# Patient Record
Sex: FEMALE | Race: BLACK OR AFRICAN AMERICAN | NOT HISPANIC OR LATINO | URBAN - METROPOLITAN AREA
[De-identification: names, ages, dates, MRNs, and addresses within clinical notes are randomized per-mention and may not be internally consistent; named-entity substitution may affect disease eponyms.]

---

## 2019-06-17 VITALS
SYSTOLIC BLOOD PRESSURE: 170 MMHG | HEART RATE: 69 BPM | TEMPERATURE: 98 F | DIASTOLIC BLOOD PRESSURE: 106 MMHG | OXYGEN SATURATION: 98 % | RESPIRATION RATE: 18 BRPM

## 2019-06-17 LAB
CK SERPL-CCNC: 492 U/L — HIGH (ref 26–192)
D DIMER BLD IA.RAPID-MCNC: <187 NG/ML DDU — SIGNIFICANT CHANGE UP
HCG UR QL: NEGATIVE — SIGNIFICANT CHANGE UP
MAGNESIUM SERPL-MCNC: 1.7 MG/DL — SIGNIFICANT CHANGE UP (ref 1.6–2.6)
TSH SERPL-MCNC: 0.27 UIU/ML — LOW (ref 0.36–3.74)

## 2019-06-17 PROCEDURE — 71275 CT ANGIOGRAPHY CHEST: CPT | Mod: 26

## 2019-06-17 RX ORDER — SODIUM CHLORIDE 9 MG/ML
1000 INJECTION INTRAMUSCULAR; INTRAVENOUS; SUBCUTANEOUS ONCE
Refills: 0 | Status: COMPLETED | OUTPATIENT
Start: 2019-06-17 | End: 2019-06-17

## 2019-06-17 RX ORDER — MAGNESIUM SULFATE 500 MG/ML
2 VIAL (ML) INJECTION ONCE
Refills: 0 | Status: COMPLETED | OUTPATIENT
Start: 2019-06-17 | End: 2019-06-17

## 2019-06-17 RX ORDER — ASPIRIN/CALCIUM CARB/MAGNESIUM 324 MG
162 TABLET ORAL ONCE
Refills: 0 | Status: COMPLETED | OUTPATIENT
Start: 2019-06-17 | End: 2019-06-17

## 2019-06-17 RX ADMIN — SODIUM CHLORIDE 1000 MILLILITER(S): 9 INJECTION INTRAMUSCULAR; INTRAVENOUS; SUBCUTANEOUS at 21:07

## 2019-06-17 RX ADMIN — Medication 50 GRAM(S): at 21:12

## 2019-06-17 RX ADMIN — Medication 162 MILLIGRAM(S): at 21:07

## 2019-06-17 NOTE — ED PROVIDER NOTE - CLINICAL SUMMARY MEDICAL DECISION MAKING FREE TEXT BOX
rule out nstemi, serial troponins, cta rule out pe, ck tsh. patient otherwise hd stble with twi in anterior leads of ekg and unifcal pvcs.

## 2019-06-17 NOTE — ED PROVIDER NOTE - OBJECTIVE STATEMENT
Patient presents after an episode of chest pressure which started at 5:30 today, which lasted 45 min, associated with tightness to left arm, and then radiated into R arm. notes shortness of breath, and generalized weakness. patient walked to a [facility] md, they did an ekg, and bp. noting her BP was high and patient was referred to ED. patient notes she recently stopped birth control 5 days. Patient notes post surgery for myomectomy at [facility] on april 22nd, with no complications. patient notes she started working out today, and did a soul cycle class, and it was a hard workout. denies other medical problems. denies smoking, etoh or drugs. denies abd pain, N/V/D. notes doing mild yoga recently and notices soreness around the wound. otherwise not abdominal pain or fevers.

## 2019-06-17 NOTE — ED ADULT NURSE REASSESSMENT NOTE - NS ED NURSE REASSESS COMMENT FT1
Pt received from ER [facility] One[facility]. Pt denies pain or sob at this time. Pt pending admission to [facility]. Pt stable will continue to monitor.

## 2019-06-17 NOTE — ED ADULT NURSE NOTE - NSIMPLEMENTINTERV_GEN_ALL_ED
Implemented All Universal Safety Interventions:  [place] to call system. Call bell, personal items and telephone within reach. Instruct patient to call for assistance. Room bathroom lighting operational. Non-slip footwear when patient is off stretcher. Physically safe environment: no spills, clutter or unnecessary equipment. Stretcher in lowest position, wheels locked, appropriate side rails in place.

## 2019-06-17 NOTE — ED ADULT TRIAGE NOTE - CHIEF COMPLAINT QUOTE
pt had fibroids removed April 22 and also recently stopped birth control. Today was walking and began to feel bilateral arm numbness, shortness of breath, chest pressure  no other medical hx or family hx  First time working out today

## 2019-06-18 ENCOUNTER — INPATIENT (INPATIENT)
Facility: HOSPITAL | Age: 39
LOS: 2 days | Discharge: HOME | DRG: 246 | End: 2019-06-21
Attending: HOSPITALIST | Admitting: HOSPITALIST
Payer: COMMERCIAL

## 2019-06-18 DIAGNOSIS — I21.4 NON-ST ELEVATION (NSTEMI) MYOCARDIAL INFARCTION: ICD-10-CM

## 2019-06-18 DIAGNOSIS — D64.9 ANEMIA, UNSPECIFIED: ICD-10-CM

## 2019-06-18 LAB
ALBUMIN SERPL ELPH-MCNC: 3.9 G/DL — SIGNIFICANT CHANGE UP (ref 3.3–5)
ALP SERPL-CCNC: 33 U/L — LOW (ref 40–120)
ALT FLD-CCNC: 10 U/L — SIGNIFICANT CHANGE UP (ref 10–45)
ANION GAP SERPL CALC-SCNC: 10 MMOL/L — SIGNIFICANT CHANGE UP (ref 5–17)
ANION GAP SERPL CALC-SCNC: 9 MMOL/L — SIGNIFICANT CHANGE UP (ref 5–17)
APTT BLD: 47.3 SEC — HIGH (ref 27.5–36.3)
AST SERPL-CCNC: 31 U/L — SIGNIFICANT CHANGE UP (ref 10–40)
BILIRUB SERPL-MCNC: 0.2 MG/DL — SIGNIFICANT CHANGE UP (ref 0.2–1.2)
BUN SERPL-MCNC: 5 MG/DL — LOW (ref 7–23)
BUN SERPL-MCNC: 8 MG/DL — SIGNIFICANT CHANGE UP (ref 7–23)
CALCIUM SERPL-MCNC: 8.2 MG/DL — LOW (ref 8.4–10.5)
CALCIUM SERPL-MCNC: 8.7 MG/DL — SIGNIFICANT CHANGE UP (ref 8.4–10.5)
CHLORIDE SERPL-SCNC: 102 MMOL/L — SIGNIFICANT CHANGE UP (ref 96–108)
CHLORIDE SERPL-SCNC: 106 MMOL/L — SIGNIFICANT CHANGE UP (ref 96–108)
CO2 SERPL-SCNC: 22 MMOL/L — SIGNIFICANT CHANGE UP (ref 22–31)
CO2 SERPL-SCNC: 24 MMOL/L — SIGNIFICANT CHANGE UP (ref 22–31)
CREAT SERPL-MCNC: 0.58 MG/DL — SIGNIFICANT CHANGE UP (ref 0.5–1.3)
CREAT SERPL-MCNC: 0.62 MG/DL — SIGNIFICANT CHANGE UP (ref 0.5–1.3)
GLUCOSE SERPL-MCNC: 110 MG/DL — HIGH (ref 70–99)
GLUCOSE SERPL-MCNC: 85 MG/DL — SIGNIFICANT CHANGE UP (ref 70–99)
HBA1C BLD-MCNC: 5 % — SIGNIFICANT CHANGE UP (ref 4–5.6)
HCT VFR BLD CALC: 33.8 % — LOW (ref 34.5–45)
HCT VFR BLD CALC: 40.2 % — SIGNIFICANT CHANGE UP (ref 34.5–45)
HGB BLD-MCNC: 10.2 G/DL — LOW (ref 11.5–15.5)
HGB BLD-MCNC: 11.7 G/DL — SIGNIFICANT CHANGE UP (ref 11.5–15.5)
MAGNESIUM SERPL-MCNC: 1.8 MG/DL — SIGNIFICANT CHANGE UP (ref 1.6–2.6)
MAGNESIUM SERPL-MCNC: 2 MG/DL — SIGNIFICANT CHANGE UP (ref 1.6–2.6)
MCHC RBC-ENTMCNC: 24.5 PG — LOW (ref 27–34)
MCHC RBC-ENTMCNC: 25.1 PG — LOW (ref 27–34)
MCHC RBC-ENTMCNC: 29.1 GM/DL — LOW (ref 32–36)
MCHC RBC-ENTMCNC: 30.2 GM/DL — LOW (ref 32–36)
MCV RBC AUTO: 83.3 FL — SIGNIFICANT CHANGE UP (ref 80–100)
MCV RBC AUTO: 84.3 FL — SIGNIFICANT CHANGE UP (ref 80–100)
NRBC # BLD: 0 /100 WBCS — SIGNIFICANT CHANGE UP (ref 0–0)
NRBC # BLD: 0 /100 WBCS — SIGNIFICANT CHANGE UP (ref 0–0)
PLATELET # BLD AUTO: 164 K/UL — SIGNIFICANT CHANGE UP (ref 150–400)
PLATELET # BLD AUTO: 195 K/UL — SIGNIFICANT CHANGE UP (ref 150–400)
POTASSIUM SERPL-MCNC: 3.4 MMOL/L — LOW (ref 3.5–5.3)
POTASSIUM SERPL-MCNC: 3.6 MMOL/L — SIGNIFICANT CHANGE UP (ref 3.5–5.3)
POTASSIUM SERPL-SCNC: 3.4 MMOL/L — LOW (ref 3.5–5.3)
POTASSIUM SERPL-SCNC: 3.6 MMOL/L — SIGNIFICANT CHANGE UP (ref 3.5–5.3)
PROT SERPL-MCNC: 6.2 G/DL — SIGNIFICANT CHANGE UP (ref 6–8.3)
RBC # BLD: 4.06 M/UL — SIGNIFICANT CHANGE UP (ref 3.8–5.2)
RBC # BLD: 4.77 M/UL — SIGNIFICANT CHANGE UP (ref 3.8–5.2)
RBC # FLD: 21.1 % — HIGH (ref 10.3–14.5)
RBC # FLD: 21.2 % — HIGH (ref 10.3–14.5)
SODIUM SERPL-SCNC: 134 MMOL/L — LOW (ref 135–145)
SODIUM SERPL-SCNC: 139 MMOL/L — SIGNIFICANT CHANGE UP (ref 135–145)
T4 AB SER-ACNC: 5.42 UG/DL — SIGNIFICANT CHANGE UP (ref 3.17–11.72)
TROPONIN I SERPL-MCNC: 0.98 NG/ML — CRITICAL HIGH (ref 0.02–0.06)
TROPONIN T SERPL-MCNC: 0.18 NG/ML — CRITICAL HIGH (ref 0–0.01)
TROPONIN T SERPL-MCNC: 0.19 NG/ML — CRITICAL HIGH (ref 0–0.01)
TSH SERPL-MCNC: 0.33 UIU/ML — LOW (ref 0.35–4.94)
WBC # BLD: 3.8 K/UL — SIGNIFICANT CHANGE UP (ref 3.8–10.5)
WBC # BLD: 4.71 K/UL — SIGNIFICANT CHANGE UP (ref 3.8–10.5)
WBC # FLD AUTO: 3.8 K/UL — SIGNIFICANT CHANGE UP (ref 3.8–10.5)
WBC # FLD AUTO: 4.71 K/UL — SIGNIFICANT CHANGE UP (ref 3.8–10.5)

## 2019-06-18 PROCEDURE — 92928 PRQ TCAT PLMT NTRAC ST 1 LES: CPT | Mod: LD

## 2019-06-18 PROCEDURE — 92978 ENDOLUMINL IVUS OCT C 1ST: CPT | Mod: 26

## 2019-06-18 PROCEDURE — 71045 X-RAY EXAM CHEST 1 VIEW: CPT | Mod: 26

## 2019-06-18 PROCEDURE — 93010 ELECTROCARDIOGRAM REPORT: CPT | Mod: 76

## 2019-06-18 PROCEDURE — 93306 TTE W/DOPPLER COMPLETE: CPT | Mod: 26

## 2019-06-18 PROCEDURE — 99285 EMERGENCY DEPT VISIT HI MDM: CPT | Mod: 25

## 2019-06-18 PROCEDURE — 99223 1ST HOSP IP/OBS HIGH 75: CPT

## 2019-06-18 PROCEDURE — 93458 L HRT ARTERY/VENTRICLE ANGIO: CPT | Mod: 26,XU

## 2019-06-18 PROCEDURE — 92929: CPT | Mod: LD,59

## 2019-06-18 RX ORDER — TICAGRELOR 90 MG/1
90 TABLET ORAL EVERY 12 HOURS
Refills: 0 | Status: DISCONTINUED | OUTPATIENT
Start: 2019-06-19 | End: 2019-06-21

## 2019-06-18 RX ORDER — SODIUM CHLORIDE 9 MG/ML
1000 INJECTION INTRAMUSCULAR; INTRAVENOUS; SUBCUTANEOUS
Refills: 0 | Status: DISCONTINUED | OUTPATIENT
Start: 2019-06-18 | End: 2019-06-18

## 2019-06-18 RX ORDER — ATORVASTATIN CALCIUM 80 MG/1
80 TABLET, FILM COATED ORAL AT BEDTIME
Refills: 0 | Status: DISCONTINUED | OUTPATIENT
Start: 2019-06-18 | End: 2019-06-21

## 2019-06-18 RX ORDER — HEPARIN SODIUM 5000 [USP'U]/ML
INJECTION INTRAVENOUS; SUBCUTANEOUS
Qty: 25000 | Refills: 0 | Status: DISCONTINUED | OUTPATIENT
Start: 2019-06-18 | End: 2019-06-18

## 2019-06-18 RX ORDER — HEPARIN SODIUM 5000 [USP'U]/ML
5000 INJECTION INTRAVENOUS; SUBCUTANEOUS EVERY 8 HOURS
Refills: 0 | Status: DISCONTINUED | OUTPATIENT
Start: 2019-06-18 | End: 2019-06-20

## 2019-06-18 RX ORDER — POTASSIUM CHLORIDE 20 MEQ
40 PACKET (EA) ORAL EVERY 4 HOURS
Refills: 0 | Status: COMPLETED | OUTPATIENT
Start: 2019-06-18 | End: 2019-06-18

## 2019-06-18 RX ORDER — CARVEDILOL PHOSPHATE 80 MG/1
12.5 CAPSULE, EXTENDED RELEASE ORAL EVERY 12 HOURS
Refills: 0 | Status: DISCONTINUED | OUTPATIENT
Start: 2019-06-18 | End: 2019-06-21

## 2019-06-18 RX ORDER — NITROGLYCERIN 6.5 MG
0.4 CAPSULE, EXTENDED RELEASE ORAL ONCE
Refills: 0 | Status: DISCONTINUED | OUTPATIENT
Start: 2019-06-18 | End: 2019-06-18

## 2019-06-18 RX ORDER — ASPIRIN/CALCIUM CARB/MAGNESIUM 324 MG
81 TABLET ORAL DAILY
Refills: 0 | Status: DISCONTINUED | OUTPATIENT
Start: 2019-06-19 | End: 2019-06-21

## 2019-06-18 RX ORDER — ATORVASTATIN CALCIUM 80 MG/1
40 TABLET, FILM COATED ORAL AT BEDTIME
Refills: 0 | Status: DISCONTINUED | OUTPATIENT
Start: 2019-06-18 | End: 2019-06-18

## 2019-06-18 RX ORDER — ACETAMINOPHEN 500 MG
650 TABLET ORAL ONCE
Refills: 0 | Status: COMPLETED | OUTPATIENT
Start: 2019-06-18 | End: 2019-06-18

## 2019-06-18 RX ORDER — ASPIRIN/CALCIUM CARB/MAGNESIUM 324 MG
162 TABLET ORAL ONCE
Refills: 0 | Status: COMPLETED | OUTPATIENT
Start: 2019-06-18 | End: 2019-06-18

## 2019-06-18 RX ORDER — POTASSIUM CHLORIDE 20 MEQ
10 PACKET (EA) ORAL
Refills: 0 | Status: DISCONTINUED | OUTPATIENT
Start: 2019-06-18 | End: 2019-06-19

## 2019-06-18 RX ORDER — CLOPIDOGREL BISULFATE 75 MG/1
600 TABLET, FILM COATED ORAL ONCE
Refills: 0 | Status: COMPLETED | OUTPATIENT
Start: 2019-06-18 | End: 2019-06-18

## 2019-06-18 RX ORDER — ONDANSETRON 8 MG/1
4 TABLET, FILM COATED ORAL ONCE
Refills: 0 | Status: COMPLETED | OUTPATIENT
Start: 2019-06-18 | End: 2019-06-18

## 2019-06-18 RX ORDER — HYDRALAZINE HCL 50 MG
10 TABLET ORAL ONCE
Refills: 0 | Status: COMPLETED | OUTPATIENT
Start: 2019-06-18 | End: 2019-06-18

## 2019-06-18 RX ORDER — MAGNESIUM SULFATE 500 MG/ML
1 VIAL (ML) INJECTION ONCE
Refills: 0 | Status: COMPLETED | OUTPATIENT
Start: 2019-06-18 | End: 2019-06-19

## 2019-06-18 RX ADMIN — Medication 650 MILLIGRAM(S): at 23:28

## 2019-06-18 RX ADMIN — Medication 40 MILLIEQUIVALENT(S): at 13:12

## 2019-06-18 RX ADMIN — HEPARIN SODIUM 1150 UNIT(S)/HR: 5000 INJECTION INTRAVENOUS; SUBCUTANEOUS at 13:16

## 2019-06-18 RX ADMIN — ATORVASTATIN CALCIUM 80 MILLIGRAM(S): 80 TABLET, FILM COATED ORAL at 23:28

## 2019-06-18 RX ADMIN — Medication 162 MILLIGRAM(S): at 01:12

## 2019-06-18 RX ADMIN — CLOPIDOGREL BISULFATE 600 MILLIGRAM(S): 75 TABLET, FILM COATED ORAL at 12:48

## 2019-06-18 RX ADMIN — SODIUM CHLORIDE 75 MILLILITER(S): 9 INJECTION INTRAMUSCULAR; INTRAVENOUS; SUBCUTANEOUS at 16:05

## 2019-06-18 RX ADMIN — Medication 40 MILLIEQUIVALENT(S): at 10:28

## 2019-06-18 RX ADMIN — CARVEDILOL PHOSPHATE 12.5 MILLIGRAM(S): 80 CAPSULE, EXTENDED RELEASE ORAL at 23:28

## 2019-06-18 RX ADMIN — ATORVASTATIN CALCIUM 40 MILLIGRAM(S): 80 TABLET, FILM COATED ORAL at 01:12

## 2019-06-18 RX ADMIN — ONDANSETRON 4 MILLIGRAM(S): 8 TABLET, FILM COATED ORAL at 23:44

## 2019-06-18 RX ADMIN — Medication 10 MILLIGRAM(S): at 19:01

## 2019-06-18 RX ADMIN — HEPARIN SODIUM 1000 UNIT(S)/HR: 5000 INJECTION INTRAVENOUS; SUBCUTANEOUS at 06:27

## 2019-06-18 NOTE — H&P ADULT - NSHPPHYSICALEXAM_GEN_ALL_CORE
Gen: Pt lying in bed in no acute distress  HEENT: sclera nonicteric; mucous membranes moist: EOMI  Neck: Supple; (-) bruits appreciated; (-) lymphadenopathy  CVS: RRR; S1S2; (-) murmur  Lungs: CTA b/l; no w/r/r  Abdomen: Soft; NT; ND; (+) BS  Ext: (-) edema  Neuro: AAOx3; (-) gross neurologic deficit

## 2019-06-18 NOTE — ED ADULT NURSE REASSESSMENT NOTE - NS ED NURSE REASSESS COMMENT FT1
Pt resting in bed at this time. Telemetry and cont pulse ox monitoring maintained. Pt denies CP or SOB at this time. Will continue to monitor.

## 2019-06-18 NOTE — H&P ADULT - NSHPLABSRESULTS_GEN_ALL_CORE
10.6   4.3   )-----------( 182      ( 17 Jun 2019 19:58 )             34.3       06-17    142  |  105  |  11  ----------------------------<  95  3.9   |  27  |  0.72    Ca    8.9      17 Jun 2019 19:58  Mg     1.7     06-17    TPro  6.9  /  Alb  3.6  /  TBili  0.2  /  DBili  x   /  AST  31  /  ALT  24  /  AlkPhos  40  06-17

## 2019-06-18 NOTE — H&P ADULT - ASSESSMENT
37 y/o F with h/o uterine fibroids, s/p myomectomy 4/22/19 presented to [facility] with c/o chest pressure and HTN, and found to have NSTEMI and now transferred to [facility] for further evaluation and management.

## 2019-06-18 NOTE — H&P ADULT - HISTORY OF PRESENT ILLNESS
39 y/o 39 y/o F with 39 y/o F with h/o uterine fibroids, s/p myomectomy 4/22/19 presented to [facility] with c/o chest pressure and HTN. Patient reports chest pressure which began in the late afternoon after she had completed an intense soul cycle session earlier. Symptoms are associated with left arm tightness/pain which also radiated to her R arm. Patient further  admits to SOB and generalized weakness. Patient subsequently went to a local [facility] MD where she was noted to be hypertensive and recommended to go to the ED for further evaluation.  In the ED, BP was noted to be 170/108 which improved to 148/93 w/o intervention. Labs were notable for Troponin 0. 289 and 0.989. Pt was given  mg x2 and her Mag of 1.7 was repleted. ECG per ER attending revealed TWI in anterior leads.      In light of her symptoms, patient was transferred to [facility] for further management and evaluation. 37 y/o F with h/o uterine fibroids, s/p myomectomy 4/22/19 presented to [facility] with c/o chest pressure and HTN. Patient was walking home from work when she began to experience left arm pain and heaviness. The sensation than spread to her chest with increasing tightness which also radiated to her right arm. Patient reports associated  SOB, palpitations and diaphoresis. She denies any N/V, dizziness or syncope. Patient further denies any LE edema or PND.  Of note: patient had taken an intense soul cycle session earlier in the day and the first since her surgery from last April.At the urgent care patient was noted to be hypertensive with . Subsequently she was recommended to go to the ED for further evaluation.  In the ED, BP was noted to be 170/108 which improved to 148/93 w/o intervention. Labs were notable for Troponin 0. 289 and 0.989. Pt was given  mg x2 and her Mag of 1.7 was repleted. ECG per ER attending revealed TWI in anterior leads.      In light of her symptoms, patient was transferred to [facility] for further management and evaluation.

## 2019-06-18 NOTE — H&P ADULT - ATTENDING COMMENTS
See PA note written above, for details. I reviewed the PA documentation.  I reviewed vitals, labs, medications, cardiac studies and additional imaging 6/18/19.  I agree with the PA's findings and plans as written above with the following additions/amendments:  38 AAF with h/o uterine fibroids, s/p myomectomy 4/22/19 presented from [facility] for further management and w/u of chest pain and hypertensive emergency. Patient ruled in NSTEMI per s/s and elevated troponin. Trop I peak 0.19. TTE 6/18 LVEF 55%, mod TR, no pHTN, mod LVH, no pericardial effusion.  Physical Exam notable for: young female laying in bed in NAD, FLAT neck veins, RRR, no MGR detected, clear lungs, soft abdomen, NTTP, no fluid wave detected, +BS, 2+ peripheral pulses, no pretibial pitting edema, no ankle edema, skin WWP throughout, A&Ox3  Plan for:  Heparin gtt  Dapt load as per d/w interventional team  High intensity statin Atorva 80  Order HbA1c, TFTs, FLP  Discharge pending [facility] results and continued clinical stability  BETSY Solis.  Cardiology Attending

## 2019-06-18 NOTE — CHART NOTE - NSCHARTNOTEFT_GEN_A_CORE
Pt seen and examined at bedside this am. No complaints at this time. Denied CP, SOB, N/V. Trop T 0.19 and pt pre-cath/consented for cath with Dr. James today. Pt loaded with aspirin 162 mg x2 and plavix 600 mg daily. Heart RRR, S1 and S2, no murmurs.  Lungs CTA B/L, radial +2 B/L, Femoral +2 B/L no bruit, DP +2 B/L, PT +1 B/L. ASA I, Mallampati II. ECHO 6/18/19: moderate LVH, normal wall motion, EF 55%, no significant valvular disease. Plan discussed with Dr. Pugh         Risks & benefits of procedure and alternative therapy have been explained to the patient including but not limited to: allergic reaction, bleeding w/possible need for blood transfusion, infection, renal and vascular compromise, limb damage, arrhythmia, stroke, vessel dissection/perforation, Myocardial infarction, emergent CABG. Informed consent obtained and in chart.

## 2019-06-19 DIAGNOSIS — Z91.89 OTHER SPECIFIED PERSONAL RISK FACTORS, NOT ELSEWHERE CLASSIFIED: ICD-10-CM

## 2019-06-19 DIAGNOSIS — D25.9 LEIOMYOMA OF UTERUS, UNSPECIFIED: ICD-10-CM

## 2019-06-19 DIAGNOSIS — R63.8 OTHER SYMPTOMS AND SIGNS CONCERNING FOOD AND FLUID INTAKE: ICD-10-CM

## 2019-06-19 DIAGNOSIS — I10 ESSENTIAL (PRIMARY) HYPERTENSION: ICD-10-CM

## 2019-06-19 LAB
ANION GAP SERPL CALC-SCNC: 12 MMOL/L — SIGNIFICANT CHANGE UP (ref 5–17)
APTT BLD: 28.5 SEC — SIGNIFICANT CHANGE UP (ref 27.5–36.3)
APTT BLD: 85.7 SEC — HIGH (ref 27.5–36.3)
BUN SERPL-MCNC: 5 MG/DL — LOW (ref 7–23)
CALCIUM SERPL-MCNC: 9.3 MG/DL — SIGNIFICANT CHANGE UP (ref 8.4–10.5)
CHLORIDE SERPL-SCNC: 102 MMOL/L — SIGNIFICANT CHANGE UP (ref 96–108)
CO2 SERPL-SCNC: 22 MMOL/L — SIGNIFICANT CHANGE UP (ref 22–31)
CREAT SERPL-MCNC: 0.57 MG/DL — SIGNIFICANT CHANGE UP (ref 0.5–1.3)
GLUCOSE SERPL-MCNC: 106 MG/DL — HIGH (ref 70–99)
HCT VFR BLD CALC: 38.7 % — SIGNIFICANT CHANGE UP (ref 34.5–45)
HGB BLD-MCNC: 11.9 G/DL — SIGNIFICANT CHANGE UP (ref 11.5–15.5)
MAGNESIUM SERPL-MCNC: 2.2 MG/DL — SIGNIFICANT CHANGE UP (ref 1.6–2.6)
MCHC RBC-ENTMCNC: 25.4 PG — LOW (ref 27–34)
MCHC RBC-ENTMCNC: 30.7 GM/DL — LOW (ref 32–36)
MCV RBC AUTO: 82.7 FL — SIGNIFICANT CHANGE UP (ref 80–100)
NRBC # BLD: 0 /100 WBCS — SIGNIFICANT CHANGE UP (ref 0–0)
PLATELET # BLD AUTO: 202 K/UL — SIGNIFICANT CHANGE UP (ref 150–400)
POTASSIUM SERPL-MCNC: 4.4 MMOL/L — SIGNIFICANT CHANGE UP (ref 3.5–5.3)
POTASSIUM SERPL-SCNC: 4.4 MMOL/L — SIGNIFICANT CHANGE UP (ref 3.5–5.3)
RBC # BLD: 4.68 M/UL — SIGNIFICANT CHANGE UP (ref 3.8–5.2)
RBC # FLD: 21.1 % — HIGH (ref 10.3–14.5)
SODIUM SERPL-SCNC: 136 MMOL/L — SIGNIFICANT CHANGE UP (ref 135–145)
T3 SERPL-MCNC: 79 NG/DL — LOW (ref 80–200)
T3 SERPL-MCNC: 80 NG/DL — SIGNIFICANT CHANGE UP (ref 80–200)
WBC # BLD: 4.74 K/UL — SIGNIFICANT CHANGE UP (ref 3.8–10.5)
WBC # FLD AUTO: 4.74 K/UL — SIGNIFICANT CHANGE UP (ref 3.8–10.5)

## 2019-06-19 PROCEDURE — 93976 VASCULAR STUDY: CPT | Mod: 26

## 2019-06-19 PROCEDURE — 93306 TTE W/DOPPLER COMPLETE: CPT | Mod: 26,77

## 2019-06-19 PROCEDURE — 74175 CTA ABDOMEN W/CONTRAST: CPT | Mod: 26

## 2019-06-19 PROCEDURE — 93010 ELECTROCARDIOGRAM REPORT: CPT

## 2019-06-19 PROCEDURE — 99291 CRITICAL CARE FIRST HOUR: CPT

## 2019-06-19 PROCEDURE — 93306 TTE W/DOPPLER COMPLETE: CPT | Mod: 26

## 2019-06-19 PROCEDURE — 76775 US EXAM ABDO BACK WALL LIM: CPT | Mod: 26,59

## 2019-06-19 PROCEDURE — 71275 CT ANGIOGRAPHY CHEST: CPT | Mod: 26

## 2019-06-19 RX ORDER — ONDANSETRON 8 MG/1
4 TABLET, FILM COATED ORAL ONCE
Refills: 0 | Status: COMPLETED | OUTPATIENT
Start: 2019-06-19 | End: 2019-06-19

## 2019-06-19 RX ORDER — NIFEDIPINE 30 MG
60 TABLET, EXTENDED RELEASE 24 HR ORAL ONCE
Refills: 0 | Status: COMPLETED | OUTPATIENT
Start: 2019-06-19 | End: 2019-06-19

## 2019-06-19 RX ORDER — ALPRAZOLAM 0.25 MG
1 TABLET ORAL ONCE
Refills: 0 | Status: DISCONTINUED | OUTPATIENT
Start: 2019-06-19 | End: 2019-06-19

## 2019-06-19 RX ORDER — POTASSIUM CHLORIDE 20 MEQ
40 PACKET (EA) ORAL ONCE
Refills: 0 | Status: COMPLETED | OUTPATIENT
Start: 2019-06-19 | End: 2019-06-19

## 2019-06-19 RX ORDER — NIFEDIPINE 30 MG
30 TABLET, EXTENDED RELEASE 24 HR ORAL ONCE
Refills: 0 | Status: DISCONTINUED | OUTPATIENT
Start: 2019-06-19 | End: 2019-06-19

## 2019-06-19 RX ORDER — MORPHINE SULFATE 50 MG/1
2 CAPSULE, EXTENDED RELEASE ORAL ONCE
Refills: 0 | Status: DISCONTINUED | OUTPATIENT
Start: 2019-06-19 | End: 2019-06-19

## 2019-06-19 RX ORDER — LANOLIN ALCOHOL/MO/W.PET/CERES
5 CREAM (GRAM) TOPICAL AT BEDTIME
Refills: 0 | Status: DISCONTINUED | OUTPATIENT
Start: 2019-06-19 | End: 2019-06-21

## 2019-06-19 RX ORDER — NIFEDIPINE 30 MG
60 TABLET, EXTENDED RELEASE 24 HR ORAL DAILY
Refills: 0 | Status: DISCONTINUED | OUTPATIENT
Start: 2019-06-20 | End: 2019-06-20

## 2019-06-19 RX ORDER — MORPHINE SULFATE 50 MG/1
2 CAPSULE, EXTENDED RELEASE ORAL EVERY 4 HOURS
Refills: 0 | Status: DISCONTINUED | OUTPATIENT
Start: 2019-06-19 | End: 2019-06-21

## 2019-06-19 RX ORDER — ACETAMINOPHEN 500 MG
1000 TABLET ORAL ONCE
Refills: 0 | Status: COMPLETED | OUTPATIENT
Start: 2019-06-19 | End: 2019-06-19

## 2019-06-19 RX ORDER — LANOLIN ALCOHOL/MO/W.PET/CERES
5 CREAM (GRAM) TOPICAL ONCE
Refills: 0 | Status: COMPLETED | OUTPATIENT
Start: 2019-06-19 | End: 2019-06-19

## 2019-06-19 RX ORDER — ACETAMINOPHEN 500 MG
650 TABLET ORAL EVERY 6 HOURS
Refills: 0 | Status: DISCONTINUED | OUTPATIENT
Start: 2019-06-19 | End: 2019-06-21

## 2019-06-19 RX ORDER — COLCHICINE 0.6 MG
0.6 TABLET ORAL ONCE
Refills: 0 | Status: COMPLETED | OUTPATIENT
Start: 2019-06-19 | End: 2019-06-19

## 2019-06-19 RX ORDER — COLCHICINE 0.6 MG
0.6 TABLET ORAL DAILY
Refills: 0 | Status: DISCONTINUED | OUTPATIENT
Start: 2019-06-20 | End: 2019-06-20

## 2019-06-19 RX ADMIN — Medication 650 MILLIGRAM(S): at 20:34

## 2019-06-19 RX ADMIN — Medication 5 MILLIGRAM(S): at 00:44

## 2019-06-19 RX ADMIN — TICAGRELOR 90 MILLIGRAM(S): 90 TABLET ORAL at 09:34

## 2019-06-19 RX ADMIN — Medication 0.6 MILLIGRAM(S): at 09:34

## 2019-06-19 RX ADMIN — ONDANSETRON 4 MILLIGRAM(S): 8 TABLET, FILM COATED ORAL at 16:29

## 2019-06-19 RX ADMIN — ATORVASTATIN CALCIUM 80 MILLIGRAM(S): 80 TABLET, FILM COATED ORAL at 21:25

## 2019-06-19 RX ADMIN — Medication 400 MILLIGRAM(S): at 05:49

## 2019-06-19 RX ADMIN — Medication 81 MILLIGRAM(S): at 12:22

## 2019-06-19 RX ADMIN — TICAGRELOR 90 MILLIGRAM(S): 90 TABLET ORAL at 21:25

## 2019-06-19 RX ADMIN — MORPHINE SULFATE 2 MILLIGRAM(S): 50 CAPSULE, EXTENDED RELEASE ORAL at 16:30

## 2019-06-19 RX ADMIN — MORPHINE SULFATE 2 MILLIGRAM(S): 50 CAPSULE, EXTENDED RELEASE ORAL at 08:38

## 2019-06-19 RX ADMIN — CARVEDILOL PHOSPHATE 12.5 MILLIGRAM(S): 80 CAPSULE, EXTENDED RELEASE ORAL at 05:53

## 2019-06-19 RX ADMIN — Medication 60 MILLIGRAM(S): at 12:22

## 2019-06-19 RX ADMIN — MORPHINE SULFATE 2 MILLIGRAM(S): 50 CAPSULE, EXTENDED RELEASE ORAL at 16:45

## 2019-06-19 RX ADMIN — Medication 0.6 MILLIGRAM(S): at 18:06

## 2019-06-19 RX ADMIN — MORPHINE SULFATE 2 MILLIGRAM(S): 50 CAPSULE, EXTENDED RELEASE ORAL at 08:53

## 2019-06-19 RX ADMIN — Medication 100 GRAM(S): at 00:44

## 2019-06-19 RX ADMIN — Medication 650 MILLIGRAM(S): at 00:00

## 2019-06-19 RX ADMIN — MORPHINE SULFATE 2 MILLIGRAM(S): 50 CAPSULE, EXTENDED RELEASE ORAL at 12:21

## 2019-06-19 RX ADMIN — MORPHINE SULFATE 2 MILLIGRAM(S): 50 CAPSULE, EXTENDED RELEASE ORAL at 12:36

## 2019-06-19 RX ADMIN — CARVEDILOL PHOSPHATE 12.5 MILLIGRAM(S): 80 CAPSULE, EXTENDED RELEASE ORAL at 18:06

## 2019-06-19 RX ADMIN — Medication 650 MILLIGRAM(S): at 23:01

## 2019-06-19 RX ADMIN — Medication 40 MILLIEQUIVALENT(S): at 00:44

## 2019-06-19 RX ADMIN — HEPARIN SODIUM 5000 UNIT(S): 5000 INJECTION INTRAVENOUS; SUBCUTANEOUS at 21:25

## 2019-06-19 RX ADMIN — HEPARIN SODIUM 5000 UNIT(S): 5000 INJECTION INTRAVENOUS; SUBCUTANEOUS at 05:53

## 2019-06-19 RX ADMIN — Medication 1000 MILLIGRAM(S): at 06:10

## 2019-06-19 RX ADMIN — Medication 5 MILLIGRAM(S): at 21:26

## 2019-06-19 RX ADMIN — Medication 1 MILLIGRAM(S): at 16:33

## 2019-06-19 RX ADMIN — HEPARIN SODIUM 5000 UNIT(S): 5000 INJECTION INTRAVENOUS; SUBCUTANEOUS at 13:32

## 2019-06-19 NOTE — PROGRESS NOTE ADULT - PROBLEM SELECTOR PLAN 1
- Presented with CP, troponin elevations, EKG changes  - s/p aspirin/brilinta loading cath with ROGER to mLAD and diagonal  - Continue aspirin, brilinta, atorvastatin 80mg  - F/u A1c, lipid profile, TSH  - Strict I/O  -Echo with LVEF 55%, moderate concentric LVH, trace MR, mild-mod TR, PASP 26  - f/u AM EKG; troponins peaked

## 2019-06-19 NOTE — PROGRESS NOTE ADULT - PROBLEM SELECTOR PLAN 1
- Presented with CP, troponin elevations, EKG changes  - s/p aspirin/brilinta loading cath with ROGER to mLAD and D1  - Continue aspirin, brilinta, atorvastatin 80mg  - F/u A1c, lipid profile, TSH  - Strict I/O  -Echo with LVEF 55%, moderate concentric LVH, trace MR, mild-mod TR, PASP 26  - f/u AM EKG; troponins peaked - Presented with CP, troponin elevations, EKG changes  - s/p aspirin/brilinta loading cath with ROGER to mLAD and diagonal  - Continue aspirin, brilinta, atorvastatin 80mg  - F/u A1c, lipid profile, TSH  - Strict I/O  -Echo with LVEF 55%, moderate concentric LVH, trace MR, mild-mod TR, PASP 26  - f/u AM EKG; troponins peaked

## 2019-06-20 LAB
ANION GAP SERPL CALC-SCNC: 10 MMOL/L — SIGNIFICANT CHANGE UP (ref 5–17)
BUN SERPL-MCNC: 9 MG/DL — SIGNIFICANT CHANGE UP (ref 7–23)
CALCIUM SERPL-MCNC: 9.8 MG/DL — SIGNIFICANT CHANGE UP (ref 8.4–10.5)
CHLORIDE SERPL-SCNC: 100 MMOL/L — SIGNIFICANT CHANGE UP (ref 96–108)
CO2 SERPL-SCNC: 24 MMOL/L — SIGNIFICANT CHANGE UP (ref 22–31)
CREAT SERPL-MCNC: 0.81 MG/DL — SIGNIFICANT CHANGE UP (ref 0.5–1.3)
GLUCOSE SERPL-MCNC: 115 MG/DL — HIGH (ref 70–99)
HCT VFR BLD CALC: 43.3 % — SIGNIFICANT CHANGE UP (ref 34.5–45)
HGB BLD-MCNC: 13.1 G/DL — SIGNIFICANT CHANGE UP (ref 11.5–15.5)
MAGNESIUM SERPL-MCNC: 1.9 MG/DL — SIGNIFICANT CHANGE UP (ref 1.6–2.6)
MCHC RBC-ENTMCNC: 24.7 PG — LOW (ref 27–34)
MCHC RBC-ENTMCNC: 30.3 GM/DL — LOW (ref 32–36)
MCV RBC AUTO: 81.7 FL — SIGNIFICANT CHANGE UP (ref 80–100)
NRBC # BLD: 0 /100 WBCS — SIGNIFICANT CHANGE UP (ref 0–0)
PLATELET # BLD AUTO: 241 K/UL — SIGNIFICANT CHANGE UP (ref 150–400)
POTASSIUM SERPL-MCNC: 4 MMOL/L — SIGNIFICANT CHANGE UP (ref 3.5–5.3)
POTASSIUM SERPL-SCNC: 4 MMOL/L — SIGNIFICANT CHANGE UP (ref 3.5–5.3)
RBC # BLD: 5.3 M/UL — HIGH (ref 3.8–5.2)
RBC # FLD: 20.7 % — HIGH (ref 10.3–14.5)
SODIUM SERPL-SCNC: 134 MMOL/L — LOW (ref 135–145)
WBC # BLD: 4.85 K/UL — SIGNIFICANT CHANGE UP (ref 3.8–10.5)
WBC # FLD AUTO: 4.85 K/UL — SIGNIFICANT CHANGE UP (ref 3.8–10.5)

## 2019-06-20 PROCEDURE — 99291 CRITICAL CARE FIRST HOUR: CPT

## 2019-06-20 PROCEDURE — 71045 X-RAY EXAM CHEST 1 VIEW: CPT | Mod: 26

## 2019-06-20 RX ORDER — CHLORHEXIDINE GLUCONATE 213 G/1000ML
1 SOLUTION TOPICAL
Refills: 0 | Status: DISCONTINUED | OUTPATIENT
Start: 2019-06-20 | End: 2019-06-21

## 2019-06-20 RX ORDER — ALPRAZOLAM 0.25 MG
0.25 TABLET ORAL ONCE
Refills: 0 | Status: DISCONTINUED | OUTPATIENT
Start: 2019-06-20 | End: 2019-06-21

## 2019-06-20 RX ORDER — COLCHICINE 0.6 MG
0.6 TABLET ORAL EVERY 12 HOURS
Refills: 0 | Status: DISCONTINUED | OUTPATIENT
Start: 2019-06-20 | End: 2019-06-21

## 2019-06-20 RX ORDER — MAGNESIUM SULFATE 500 MG/ML
1 VIAL (ML) INJECTION ONCE
Refills: 0 | Status: COMPLETED | OUTPATIENT
Start: 2019-06-20 | End: 2019-06-20

## 2019-06-20 RX ADMIN — Medication 100 GRAM(S): at 09:58

## 2019-06-20 RX ADMIN — CARVEDILOL PHOSPHATE 12.5 MILLIGRAM(S): 80 CAPSULE, EXTENDED RELEASE ORAL at 05:18

## 2019-06-20 RX ADMIN — HEPARIN SODIUM 5000 UNIT(S): 5000 INJECTION INTRAVENOUS; SUBCUTANEOUS at 05:18

## 2019-06-20 RX ADMIN — Medication 650 MILLIGRAM(S): at 06:52

## 2019-06-20 RX ADMIN — Medication 650 MILLIGRAM(S): at 07:22

## 2019-06-20 RX ADMIN — Medication 0.6 MILLIGRAM(S): at 09:59

## 2019-06-20 RX ADMIN — Medication 60 MILLIGRAM(S): at 05:18

## 2019-06-20 RX ADMIN — CARVEDILOL PHOSPHATE 12.5 MILLIGRAM(S): 80 CAPSULE, EXTENDED RELEASE ORAL at 18:06

## 2019-06-20 RX ADMIN — ATORVASTATIN CALCIUM 80 MILLIGRAM(S): 80 TABLET, FILM COATED ORAL at 22:11

## 2019-06-20 RX ADMIN — TICAGRELOR 90 MILLIGRAM(S): 90 TABLET ORAL at 09:58

## 2019-06-20 RX ADMIN — Medication 0.6 MILLIGRAM(S): at 18:06

## 2019-06-20 RX ADMIN — TICAGRELOR 90 MILLIGRAM(S): 90 TABLET ORAL at 22:11

## 2019-06-20 RX ADMIN — Medication 81 MILLIGRAM(S): at 11:13

## 2019-06-20 RX ADMIN — HEPARIN SODIUM 5000 UNIT(S): 5000 INJECTION INTRAVENOUS; SUBCUTANEOUS at 14:10

## 2019-06-20 NOTE — PROGRESS NOTE ADULT - PROBLEM SELECTOR PLAN 3
- Hx of uterine fibroids s/p myomectomy 4/2019  - with severe anemia, was previously on OCPs (stopped 6 days ago) and iron supplements (stopped a few months ago)  - No evidence of severe anemia currently, monitor H/H

## 2019-06-20 NOTE — PROGRESS NOTE ADULT - SUBJECTIVE AND OBJECTIVE BOX
TRANSFER NOTE CATH LAB TO CCU    HOSPITAL COURSE    This is a 38F with uterine fibroids s/p myomectomy 4/22/19 and HTN who presented to [facility] for chest pressure and elevated BP associated with L arm pain. Patient noted to have BP to 190s systolic on admission with troponin elevation and new T wave inversions in anterior leads. Patient was aspirin/brilinta loaded and taken to cath lab for NSTEMI. Cath 6/18/19 showed spontaneous dissection of mLAD and diagonal and ROGER was placed to both. Patient remained stable and was transferred to CCU for post-cath hypertension management.     SUBJECTIVE / INTERVAL HPI: Patient seen and examined at bedside. Reports chest pain that does not radiation and not associated with shortness of breath. Pain is described as a soreness and improved compared to pain prior to cath. Reports some nausea. No vomiting, no abd pain, ROS otherwise negative.     VITAL SIGNS:  Vital Signs Last 24 Hrs  T(C): 36.6 (18 Jun 2019 14:03), Max: 36.9 (18 Jun 2019 06:53)  T(F): 97.8 (18 Jun 2019 14:03), Max: 98.4 (18 Jun 2019 06:53)  HR: 66 (18 Jun 2019 23:45) (60 - 72)  BP: 154/102 (18 Jun 2019 23:45) (143/96 - 170/112)  BP(mean): 117 (18 Jun 2019 23:45) (113 - 130)  RR: 19 (18 Jun 2019 23:45) (8 - 28)  SpO2: 99% (18 Jun 2019 23:45) (95% - 100%)    PHYSICAL EXAM:    General: Well developed, well nourished, no acute distress  HEENT: NC/AT; PERRL, anicteric sclera; MMM  Neck: supple  Cardiovascular: +S1/S2, RRR, no murmurs, rubs, gallops  Respiratory: CTA B/L; no W/R/R  Gastrointestinal: soft, NT/ND; +BSx4  Extremities: WWP; no edema, clubbing or cyanosis  Vascular: 2+ radial and pedal pulses  Neurological: AAOx3; no focal deficits    MEDICATIONS:  MEDICATIONS  (STANDING):  aspirin enteric coated 81 milliGRAM(s) Oral daily  atorvastatin 80 milliGRAM(s) Oral at bedtime  carvedilol 12.5 milliGRAM(s) Oral every 12 hours  heparin  Injectable 5000 Unit(s) SubCutaneous every 8 hours  magnesium sulfate  IVPB 1 Gram(s) IV Intermittent once  melatonin 5 milliGRAM(s) Oral at bedtime  potassium chloride  10 mEq/100 mL IVPB 10 milliEquivalent(s) IV Intermittent every 1 hour  ticagrelor 90 milliGRAM(s) Oral every 12 hours    MEDICATIONS  (PRN):      ALLERGIES:  Allergies    No Known Allergies    Intolerances        LABS:                        11.7   4.71  )-----------( 195      ( 18 Jun 2019 23:20 )             40.2     06-18    134<L>  |  102  |  5<L>  ----------------------------<  110<H>  3.6   |  22  |  0.58    Ca    8.7      18 Jun 2019 23:20  Mg     1.8     06-18    TPro  6.2  /  Alb  3.9  /  TBili  0.2  /  DBili  x   /  AST  31  /  ALT  10  /  AlkPhos  33<L>  06-18    PTT - ( 18 Jun 2019 11:49 )  PTT:47.3 sec    CAPILLARY BLOOD GLUCOSE      RADIOLOGY & ADDITIONAL TESTS: Reviewed.
INTERVAL HPI/OVERNIGHT EVENTS:  Patient was seen and examined at bedside. As per nurse and patient, no o/n events, patient resting comfortably. No complaints at this time. Patient denies: fever, chills, dizziness, weakness, HA, Changes in vision, CP, palpitations, SOB, cough, N/V/D/C, dysuria, changes in bowel movements, LE edema. ROS otherwise negative.    VITAL SIGNS:  T(F): 98.5 (06-20-19 @ 09:00)  HR: 94 (06-20-19 @ 13:00)  BP: 99/76 (06-20-19 @ 13:00)  RR: 18 (06-20-19 @ 13:00)  SpO2: 97% (06-20-19 @ 13:00)  Wt(kg): --    PHYSICAL EXAM:    Constitutional: WDWN, NAD  HEENT: PERRL, EOMI, sclera non-icteric, neck supple, trachea midline, no masses, no JVD, MMM, good dentition  Respiratory: CTA b/l, good air entry b/l, no wheezing, no rhonchi, no rales, without accessory muscle use and no intercostal retractions  Cardiovascular: RRR, normal S1S2, no M/R/G  Gastrointestinal: soft, NTND, no masses palpable, BS normal  Extremities: Warm, well perfused, pulses equal bilateral upper and lower extremities, no edema, no clubbing  Neurological: AAOx3, CN Grossly intact  Skin: Normal temperature, warm, dry    MEDICATIONS  (STANDING):  aspirin enteric coated 81 milliGRAM(s) Oral daily  atorvastatin 80 milliGRAM(s) Oral at bedtime  carvedilol 12.5 milliGRAM(s) Oral every 12 hours  chlorhexidine 2% Cloths 1 Application(s) Topical <User Schedule>  colchicine 0.6 milliGRAM(s) Oral every 12 hours  heparin  Injectable 5000 Unit(s) SubCutaneous every 8 hours  melatonin 5 milliGRAM(s) Oral at bedtime  NIFEdipine XL 60 milliGRAM(s) Oral daily  ticagrelor 90 milliGRAM(s) Oral every 12 hours    MEDICATIONS  (PRN):  acetaminophen   Tablet .. 650 milliGRAM(s) Oral every 6 hours PRN Moderate Pain (4 - 6)  morphine  - Injectable 2 milliGRAM(s) IV Push every 4 hours PRN Severe Pain (7 - 10)      Allergies    No Known Allergies    Intolerances        LABS:                        13.1   4.85  )-----------( 241      ( 20 Jun 2019 05:27 )             43.3     06-20    134<L>  |  100  |  9   ----------------------------<  115<H>  4.0   |  24  |  0.81    Ca    9.8      20 Jun 2019 05:27  Mg     1.9     06-20      PTT - ( 19 Jun 2019 05:08 )  PTT:28.5 sec      RADIOLOGY & ADDITIONAL TESTS:  Reviewed
INTERVAL HPI/OVERNIGHT EVENTS:  Patient was seen and examined at bedside. Pt c/o sharp chest pain and new tingling parathesias at fingertips. No complaints at this time. Patient denies: fever, chills, dizziness, weakness, HA, Changes in vision, CP, palpitations, SOB, cough, N/V/D/C, dysuria, changes in bowel movements, LE edema. ROS otherwise negative.    VITAL SIGNS:  T(F): 98.4 (06-19-19 @ 17:30)  HR: 74 (06-19-19 @ 17:55)  BP: 117/80 (06-19-19 @ 17:55)  RR: 20 (06-19-19 @ 17:55)  SpO2: 97% (06-19-19 @ 17:55)  Wt(kg): --    PHYSICAL EXAM:    Constitutional: WDWN, NAD  HEENT: PERRL, EOMI, sclera non-icteric, neck supple, trachea midline, no masses, no JVD, MMM, good dentition  Respiratory: CTA b/l, good air entry b/l, no wheezing, no rhonchi, no rales, without accessory muscle use and no intercostal retractions  Cardiovascular: RRR, normal S1S2, no M/R/G  R hand: 2+ pulse, slight TTP, no hematoma   Gastrointestinal: soft, NTND, no masses palpable, BS normal  Extremities: Warm, well perfused, pulses equal bilateral upper and lower extremities, no edema, no clubbing  Neurological: AAOx3, CN Grossly intact  Skin: Normal temperature, warm, dry    MEDICATIONS  (STANDING):  aspirin enteric coated 81 milliGRAM(s) Oral daily  atorvastatin 80 milliGRAM(s) Oral at bedtime  carvedilol 12.5 milliGRAM(s) Oral every 12 hours  heparin  Injectable 5000 Unit(s) SubCutaneous every 8 hours  melatonin 5 milliGRAM(s) Oral at bedtime  ticagrelor 90 milliGRAM(s) Oral every 12 hours    MEDICATIONS  (PRN):  morphine  - Injectable 2 milliGRAM(s) IV Push every 4 hours PRN Severe Pain (7 - 10)      Allergies    No Known Allergies    Intolerances        LABS:                        11.9   4.74  )-----------( 202      ( 19 Jun 2019 05:08 )             38.7     06-19    136  |  102  |  5<L>  ----------------------------<  106<H>  4.4   |  22  |  0.57    Ca    9.3      19 Jun 2019 05:08  Mg     2.2     06-19    TPro  6.2  /  Alb  3.9  /  TBili  0.2  /  DBili  x   /  AST  31  /  ALT  10  /  AlkPhos  33<L>  06-18    PTT - ( 19 Jun 2019 05:08 )  PTT:28.5 sec      RADIOLOGY & ADDITIONAL TESTS:  Reviewed

## 2019-06-20 NOTE — PROGRESS NOTE ADULT - ATTENDING COMMENTS
37yo F w/ h/o Uterine fibroids p/w sudden onset CP -> NSTEMI, admitted for primary PCI    -NSTEMI - s/p LHC showing Type II SCAD of the LAD into the D2 branch - pt. is s/p ROGER x 4 to the LAD/D2; c/w ASA/Brillinta and Lipitor; c/w Colchicine BID for pericarditis  -HTN - CTA Abd showed no evidence of FMD or JOSE ; BP well controlled - c/w Coreg 12.5 BID and Nifedipine 60 PO daily; Will send off secondary HTN w/u  -DASH diet  -OOB to chair  -DVT PPx: HSQ  -Dispo: CCU    Sandro Wilson MD  CCU Attending    I have personally provided 50 minutes of critical care time concurrently with the Fellow/Resident, excluding time spent on procedures.
39yo F w/ h/o Uterine fibroids p/w sudden onset CP -> NSTEMI, admitted for primary PCI    -NSTEMI - s/p LHC showing Type II SCAD of the LAD into the D2 branch - pt. is s/p ROGER x 4 to the LAD/D2; c/w ASA/Brillinta and Lipitor; Pt. now w/ mild sharp CP - different from initial CP, no new EKG changes - likely 2/2 Pericarditis - will start Colchicine  -HTN - Pt. w/ significant HTN and famHx - will need to r/o FMD and JOSE - Will get CTA Abdomen and  b/l Renal Art doppler; c/w Coreg 12.5 BID and Nifedipine 60 PO daily  -DASH diet  -OOB to chair  -DVT PPx: HSQ  -Dispo: CCU    Sandro Wilson MD  CCU Attending    I have personally provided 60 minutes of critical care time concurrently with the Fellow/Resident, excluding time spent on procedures.

## 2019-06-20 NOTE — DIETITIAN INITIAL EVALUATION ADULT. - OTHER INFO
Nutrition consult for diet edu by RN appreciated. 38 y.o F from home with PMHx of Uterine Fibroids s/p myomectomy 4/22/19. Pt p/w CP, admitted for NSTEMI and underwent cardiac cath 6/19. Pt cooks at home, used to follow a healthy diet prior to myomectomy, good appetite, NKFA. Pt admits to unintentional weight gain of about 10 lbs x 1 month from increased PO intake.  lbs per pt. Pt tolerating DASH/TLC diet well with good >75% PO intake of meals. Pt had mild nausea this AM, now resolved, denies vomiting, diarrhea and pain. +constipation, last BM 6/17. Skin intact. Nutrition edu provided to pt with handouts on DASH/TLC diet. Pt verbalized understanding and good compliance expected. RD will f/u per moderate risk protocol.

## 2019-06-20 NOTE — PROGRESS NOTE ADULT - PROBLEM SELECTOR PLAN 5
1) PCP Contacted on Admission: (Y/N) --> Name & Phone #:   2) Date of Contact with PCP:  3) PCP Contacted at Discharge: (Y/N, N/A)  4) Summary of Handoff Given to PCP:   5) Post-Discharge Appointment Date and Location:

## 2019-06-20 NOTE — PROGRESS NOTE ADULT - PROBLEM SELECTOR PLAN 1
- Presented with CP, troponin elevations, EKG changes  - s/p aspirin/brilinta loading cath with ROGER to mLAD and diagonal  - Continue aspirin, brilinta, atorvastatin 80mg  - F/u A1c, lipid profile, TSH: WNL  - Strict I/O  -Echo with LVEF 55%, moderate concentric LVH, trace MR, mild-mod TR, PASP 26  - f/u AM EKG; troponins peaked  -CT angio: no aoritc dissection  -Renal u/s: no FMD

## 2019-06-20 NOTE — DIETITIAN INITIAL EVALUATION ADULT. - ENERGY NEEDS
Height: 5'7" Weight: 84.8 kg, 85.8 kg (6/20), 84.8 kg, 85.1 kg (6/19), IBW 61 kg+/-10%, %%, BMI 29.3,  IBW used to calculate EER as per pt's current body weight >120% of IBW   Estimated nutrient needs based on [facility] SOC for maintenance in adults adjusted for overweight

## 2019-06-20 NOTE — PROGRESS NOTE ADULT - PROBLEM SELECTOR PLAN 2
- Hx of HTN, not on medications  - Continue coreg 12.5mg BID  - Additional agents as needed to improve BP control
- Hx of HTN, not on medications  - Continue coreg 12.5mg BID  - Additional agents as needed to improve BP control
- Hx of HTN, not on medications  - Continue coreg 12.5mg BID  - C/w Nifedipine 60

## 2019-06-20 NOTE — PROGRESS NOTE ADULT - ASSESSMENT
This is a 38F with uterine fibroids s/p myomectomy 4/22/19 and HTN who presented to [facility] for chest pressure and elevated BP associated with L arm pain admitted for NSTEMI no s/p cardiac cath with ROGER to mLAD and D1.
This is a 38F with uterine fibroids s/p myomectomy 4/22/19 and HTN who presented to [facility] for chest pressure and elevated BP associated with L arm pain admitted for NSTEMI now s/p cardiac cath with ROGER to mLAD and D1. Feeling improved. Meds optimized.
This is a 38F with uterine fibroids s/p myomectomy 4/22/19 and HTN who presented to [facility] for chest pressure and elevated BP associated with L arm pain admitted for NSTEMI now s/p cardiac cath with ROGER to mLAD and D1.

## 2019-06-20 NOTE — PROGRESS NOTE ADULT - PROBLEM SELECTOR PLAN 4
F: No IVF  E: Replete PRN  N: DASH/TLC  FULL CODE   HSQ  D: CCU

## 2019-06-21 ENCOUNTER — TRANSCRIPTION ENCOUNTER (OUTPATIENT)
Age: 39
End: 2019-06-21

## 2019-06-21 VITALS
OXYGEN SATURATION: 99 % | RESPIRATION RATE: 18 BRPM | SYSTOLIC BLOOD PRESSURE: 85 MMHG | DIASTOLIC BLOOD PRESSURE: 59 MMHG | HEART RATE: 82 BPM

## 2019-06-21 LAB
ALDOST SERPL-MCNC: 12.3 NG/DL — SIGNIFICANT CHANGE UP
ANA TITR SER: NEGATIVE — SIGNIFICANT CHANGE UP
ANION GAP SERPL CALC-SCNC: 12 MMOL/L — SIGNIFICANT CHANGE UP (ref 5–17)
APTT BLD: 29.5 SEC — SIGNIFICANT CHANGE UP (ref 27.5–36.3)
BUN SERPL-MCNC: 20 MG/DL — SIGNIFICANT CHANGE UP (ref 7–23)
CALCIUM SERPL-MCNC: 9.1 MG/DL — SIGNIFICANT CHANGE UP (ref 8.4–10.5)
CHLORIDE SERPL-SCNC: 103 MMOL/L — SIGNIFICANT CHANGE UP (ref 96–108)
CO2 SERPL-SCNC: 23 MMOL/L — SIGNIFICANT CHANGE UP (ref 22–31)
CREAT SERPL-MCNC: 0.84 MG/DL — SIGNIFICANT CHANGE UP (ref 0.5–1.3)
GLUCOSE SERPL-MCNC: 95 MG/DL — SIGNIFICANT CHANGE UP (ref 70–99)
HCT VFR BLD CALC: 41.1 % — SIGNIFICANT CHANGE UP (ref 34.5–45)
HGB BLD-MCNC: 12.4 G/DL — SIGNIFICANT CHANGE UP (ref 11.5–15.5)
INR BLD: 0.98 — SIGNIFICANT CHANGE UP (ref 0.88–1.16)
MAGNESIUM SERPL-MCNC: 1.8 MG/DL — SIGNIFICANT CHANGE UP (ref 1.6–2.6)
MCHC RBC-ENTMCNC: 25.1 PG — LOW (ref 27–34)
MCHC RBC-ENTMCNC: 30.2 GM/DL — LOW (ref 32–36)
MCV RBC AUTO: 83 FL — SIGNIFICANT CHANGE UP (ref 80–100)
NRBC # BLD: 0 /100 WBCS — SIGNIFICANT CHANGE UP (ref 0–0)
PLATELET # BLD AUTO: 205 K/UL — SIGNIFICANT CHANGE UP (ref 150–400)
POTASSIUM SERPL-MCNC: 3.9 MMOL/L — SIGNIFICANT CHANGE UP (ref 3.5–5.3)
POTASSIUM SERPL-SCNC: 3.9 MMOL/L — SIGNIFICANT CHANGE UP (ref 3.5–5.3)
PROTHROM AB SERPL-ACNC: 11.1 SEC — SIGNIFICANT CHANGE UP (ref 10–12.9)
RBC # BLD: 4.95 M/UL — SIGNIFICANT CHANGE UP (ref 3.8–5.2)
RBC # FLD: 20.8 % — HIGH (ref 10.3–14.5)
RENIN DIRECT, PLASMA: 7.1 PG/ML — SIGNIFICANT CHANGE UP
SODIUM SERPL-SCNC: 138 MMOL/L — SIGNIFICANT CHANGE UP (ref 135–145)
WBC # BLD: 4.08 K/UL — SIGNIFICANT CHANGE UP (ref 3.8–10.5)
WBC # FLD AUTO: 4.08 K/UL — SIGNIFICANT CHANGE UP (ref 3.8–10.5)

## 2019-06-21 PROCEDURE — 99239 HOSP IP/OBS DSCHRG MGMT >30: CPT

## 2019-06-21 RX ORDER — LISINOPRIL 2.5 MG/1
5 TABLET ORAL ONCE
Refills: 0 | Status: DISCONTINUED | OUTPATIENT
Start: 2019-06-21 | End: 2019-06-21

## 2019-06-21 RX ORDER — ATORVASTATIN CALCIUM 80 MG/1
1 TABLET, FILM COATED ORAL
Qty: 30 | Refills: 6
Start: 2019-06-21 | End: 2020-01-16

## 2019-06-21 RX ORDER — LISINOPRIL 2.5 MG/1
2.5 TABLET ORAL ONCE
Refills: 0 | Status: COMPLETED | OUTPATIENT
Start: 2019-06-21 | End: 2019-06-21

## 2019-06-21 RX ORDER — COLCHICINE 0.6 MG
1 TABLET ORAL
Qty: 50 | Refills: 0
Start: 2019-06-21 | End: 2019-07-15

## 2019-06-21 RX ORDER — CARVEDILOL PHOSPHATE 80 MG/1
1 CAPSULE, EXTENDED RELEASE ORAL
Qty: 60 | Refills: 11
Start: 2019-06-21 | End: 2020-06-14

## 2019-06-21 RX ORDER — TICAGRELOR 90 MG/1
1 TABLET ORAL
Qty: 60 | Refills: 11
Start: 2019-06-21 | End: 2020-06-14

## 2019-06-21 RX ORDER — ASPIRIN/CALCIUM CARB/MAGNESIUM 324 MG
1 TABLET ORAL
Qty: 30 | Refills: 11
Start: 2019-06-21 | End: 2020-06-14

## 2019-06-21 RX ADMIN — TICAGRELOR 90 MILLIGRAM(S): 90 TABLET ORAL at 09:49

## 2019-06-21 RX ADMIN — CARVEDILOL PHOSPHATE 12.5 MILLIGRAM(S): 80 CAPSULE, EXTENDED RELEASE ORAL at 05:34

## 2019-06-21 RX ADMIN — Medication 81 MILLIGRAM(S): at 11:35

## 2019-06-21 RX ADMIN — LISINOPRIL 2.5 MILLIGRAM(S): 2.5 TABLET ORAL at 11:35

## 2019-06-21 RX ADMIN — Medication 0.6 MILLIGRAM(S): at 05:34

## 2019-06-21 NOTE — DISCHARGE NOTE PROVIDER - CARE PROVIDER_API CALL
Terry James (MD)  Cardiology; Interventional Cardiology  [address] [address]  Phone: [phone]  Fax: [phone]  Follow Up Time:     Bora Enriquez)  Cardiovascular Disease; Endovascular Medicine; Interventional Cardiology; Vascular Medicine  [address] [place] [address]  Phone: [phone]  Fax: [phone]  Follow Up Time:

## 2019-06-21 NOTE — DISCHARGE NOTE NURSING/CASE MANAGEMENT/SOCIAL WORK - NSDCDPATPORTLINK_GEN_ALL_CORE
You can access the [facility] Patient Portal, offered by [facility], by registering with the following website: [email or web address]

## 2019-06-21 NOTE — DISCHARGE NOTE PROVIDER - HOSPITAL COURSE
This is a 38F with uterine fibroids s/p myomectomy 4/22/19 and HTN who presented to [facility] for chest pressure and elevated BP associated with L arm pain. Patient noted to have BP to 190s systolic on admission with troponin elevation and new T wave inversions in anterior leads. Patient was aspirin/brilinta loaded and taken to cath lab for NSTEMI. Cath 6/18/19 showed spontaneous dissection of mLAD and diagonal and ROGER was placed to both. Patient remained stable and was transferred to CCU for post-cath hypertension management. Trops peaked. Added lisinopril for BP control. Echo with mild seg LV systolic dysfcn, hypokinesis of anteroseptum, mid to distal inferoseptum and mid to distal anterior wall.  The left ventricular ejection fraction is 40-45%.Dilated aorta. Colchicine loaded for mild pericarditis? CTangio: no aortic dissection. Renal dopplers no JOSE. Initial w/u for resistant hypertension sent. Stable for dc with close follow up with interventional team.

## 2019-06-21 NOTE — DISCHARGE NOTE PROVIDER - NSDCCPCAREPLAN_GEN_ALL_CORE_FT
PRINCIPAL DISCHARGE DIAGNOSIS  Diagnosis: NSTEMI (non-ST elevated myocardial infarction)  Assessment and Plan of Treatment: Your chest pain was due to a heart attack due to a spontaneous coronary dissection. We placed stents in your LAD and Diagonal Arteries of your heart to keep these vessels open. We will continue you on ASPIRIN daily, BRILINTA twice a day to keep the stents open as well as ATORVASTATIN daily and COREG 6.125 twice a day. We will send you to Cardiac Rehab to improve your strength. You should not perform strenuous exercise until your appointment with Dr. James, he will advise you on how to increase your activity levels.   See Dr. James on July 1st at 1pm. Call Dr. Enriquez's office for an appointment in two weeks.      SECONDARY DISCHARGE DIAGNOSES  Diagnosis: Acute pericarditis  Assessment and Plan of Treatment: Your chest pain may have a component of mild pericarditis. We will treat you with Colchicine twice a day for 25 days. Discuss this with Dr. James, he may advise you that you've had enough treatment after two weeks. This medication may cause diarhhea or stomach upset.    Diagnosis: Hypertension  Assessment and Plan of Treatment: You have high blood pressures. You should eat a low salt, low saturatuated fat diet and get 30 minutes of exercise three times a week. We will start you on lisinopril 2.5mg daily. You should see your primary care provider within two weeks of discharge. Your scans were negative for Fibromuscular Dysplasia and Renal Artery Stenosis.

## 2019-06-21 NOTE — DISCHARGE NOTE PROVIDER - CARE PROVIDERS DIRECT ADDRESSES
[email or web address]

## 2019-06-21 NOTE — DISCHARGE NOTE PROVIDER - NSDCACTIVITY_GEN_ALL_CORE
Return to Work/School allowed/Walking - Outdoors allowed/No heavy lifting/straining/Showering allowed/Walking - Indoors allowed/Stairs allowed

## 2019-06-22 RX ORDER — LISINOPRIL 2.5 MG/1
1 TABLET ORAL
Qty: 30 | Refills: 3
Start: 2019-06-22 | End: 2019-10-19

## 2019-06-25 PROBLEM — Z00.00 ENCOUNTER FOR PREVENTIVE HEALTH EXAMINATION: Status: ACTIVE | Noted: 2019-06-25

## 2019-07-01 ENCOUNTER — APPOINTMENT (OUTPATIENT)
Dept: HEART AND VASCULAR | Facility: CLINIC | Age: 39
End: 2019-07-01
Payer: COMMERCIAL

## 2019-07-01 VITALS
SYSTOLIC BLOOD PRESSURE: 118 MMHG | OXYGEN SATURATION: 100 % | DIASTOLIC BLOOD PRESSURE: 81 MMHG | HEART RATE: 64 BPM | BODY MASS INDEX: 28.72 KG/M2 | HEIGHT: 67 IN | WEIGHT: 183 LBS

## 2019-07-01 DIAGNOSIS — I50.20 UNSPECIFIED SYSTOLIC (CONGESTIVE) HEART FAILURE: ICD-10-CM

## 2019-07-01 DIAGNOSIS — Z95.5 PRESENCE OF CORONARY ANGIOPLASTY IMPLANT AND GRAFT: ICD-10-CM

## 2019-07-01 DIAGNOSIS — Z86.79 PERSONAL HISTORY OF OTHER DISEASES OF THE CIRCULATORY SYSTEM: ICD-10-CM

## 2019-07-01 DIAGNOSIS — I10 ESSENTIAL (PRIMARY) HYPERTENSION: ICD-10-CM

## 2019-07-01 DIAGNOSIS — I25.10 ATHEROSCLEROTIC HEART DISEASE OF NATIVE CORONARY ARTERY W/OUT ANGINA PECTORIS: ICD-10-CM

## 2019-07-01 DIAGNOSIS — Z86.018 PERSONAL HISTORY OF OTHER BENIGN NEOPLASM: ICD-10-CM

## 2019-07-01 DIAGNOSIS — I31.9 DISEASE OF PERICARDIUM, UNSPECIFIED: ICD-10-CM

## 2019-07-01 PROCEDURE — 99204 OFFICE O/P NEW MOD 45 MIN: CPT

## 2019-07-01 RX ORDER — ASPIRIN 81 MG/1
81 TABLET, CHEWABLE ORAL
Qty: 180 | Refills: 0 | Status: COMPLETED | COMMUNITY
Start: 2019-07-01

## 2019-07-01 RX ORDER — COLCHICINE 0.6 MG/1
0.6 TABLET, FILM COATED ORAL TWICE DAILY
Refills: 0 | Status: ACTIVE | COMMUNITY
Start: 2019-07-01

## 2019-07-01 NOTE — REASON FOR VISIT
[Follow-Up - From Hospitalization] : follow-up of a recent hospitalization for [Coronary Artery Disease] : coronary artery disease [FreeTextEntry1] : SCAD

## 2019-07-01 NOTE — DISCUSSION/SUMMARY
[Coronary Artery Disease] : coronary artery disease [Acute Myocardial Infarction] : acute myocardial infarction [Stable] : stable [Exercise Regimen] : an exercise regimen [___ Month(s)] : [unfilled] month(s) [FreeTextEntry4] : SCAD [FreeTextEntry1] : 39 y/o F with PMHx of recent NSTEMI from likely SCAD s/p PCI to LAD/D2 on 6/18/2019\par TTE on 6/19/2019 at time of hospital admission showed EF 40-45% \par Now presents for follow up post hospital discharge. Asymptomatic, feeling well, slowing starting to return to her routine.\par \par # SCAD s/p PCI\par Cotninue DAPT, ASA for life and Brilinta for at least 1 year\par Optimal medical therapy, with statin, betablocker and ACEi\par \par # HFrEF\par Repeat Echocardiogram on next visit\par Continue BB and ACEi\par \par # Post MI Pericarditis\par Continue Colchicine until complete course\par \par # HTN\par BPs at goal on todays visit

## 2019-07-01 NOTE — PHYSICAL EXAM
[General Appearance - Well Developed] : well developed [Normal Appearance] : normal appearance [Well Groomed] : well groomed [General Appearance - Well Nourished] : well nourished [No Deformities] : no deformities [General Appearance - In No Acute Distress] : no acute distress [Normal Conjunctiva] : the conjunctiva exhibited no abnormalities [Eyelids - No Xanthelasma] : the eyelids demonstrated no xanthelasmas [Normal Oral Mucosa] : normal oral mucosa [No Oral Pallor] : no oral pallor [No Oral Cyanosis] : no oral cyanosis [Heart Rate And Rhythm] : heart rate and rhythm were normal [Heart Sounds] : normal S1 and S2 [Murmurs] : no murmurs present [Respiration, Rhythm And Depth] : normal respiratory rhythm and effort [Exaggerated Use Of Accessory Muscles For Inspiration] : no accessory muscle use [Auscultation Breath Sounds / Voice Sounds] : lungs were clear to auscultation bilaterally [Abnormal Walk] : normal gait [Nail Clubbing] : no clubbing of the fingernails [Cyanosis, Localized] : no localized cyanosis [Petechial Hemorrhages (___cm)] : no petechial hemorrhages [] : no ischemic changes [Skin Color & Pigmentation] : normal skin color and pigmentation [No Venous Stasis] : no venous stasis [Oriented To Time, Place, And Person] : oriented to person, place, and time [Affect] : the affect was normal

## 2019-07-01 NOTE — HISTORY OF PRESENT ILLNESS
[FreeTextEntry1] : 39 y/o F with PMHx of NSTEMI w/ SCAD on 6/18/2019 in LAD /D2 s/p PCI w/ ROGER x3, HTN, Uterine fibroids s/p Myomectomy (4/2019) presents for follow up from discharge from hospital.\par \par Patient presented to hospital on 6/18 with chest pain associated left sided arm pan while at [employer]' doing strenuous exercise, found to have EKG changes and elevated Troponin. Cardiac cath showed dissection of LAD confirmed with IVUS, extending to D2, received 3 stents. During her hospital course she was started on Colchicine for concern for post MI pericarditis.\par \par Since discharge patient states that she has been chest pain free, denies SOB, palpitations, syncope, edema, PND/Orthopnea. Has been slowly returning to her routine and has started to go back to work. ETT is reduced from prior to the event. Reports compliance with her medications.\par \par She is concerned about being able to get back to her exercise routine and also regarding getting pregnant.\par \par

## 2019-07-02 DIAGNOSIS — I77.810 THORACIC AORTIC ECTASIA: ICD-10-CM

## 2019-07-02 DIAGNOSIS — I21.4 NON-ST ELEVATION (NSTEMI) MYOCARDIAL INFARCTION: ICD-10-CM

## 2019-07-02 DIAGNOSIS — I25.42 CORONARY ARTERY DISSECTION: ICD-10-CM

## 2019-07-02 DIAGNOSIS — I16.1 HYPERTENSIVE EMERGENCY: ICD-10-CM

## 2019-07-02 DIAGNOSIS — I10 ESSENTIAL (PRIMARY) HYPERTENSION: ICD-10-CM

## 2019-07-02 DIAGNOSIS — I30.9 ACUTE PERICARDITIS, UNSPECIFIED: ICD-10-CM

## 2019-07-02 DIAGNOSIS — I36.1 NONRHEUMATIC TRICUSPID (VALVE) INSUFFICIENCY: ICD-10-CM

## 2019-07-02 DIAGNOSIS — Z98.890 OTHER SPECIFIED POSTPROCEDURAL STATES: ICD-10-CM

## 2019-07-09 PROCEDURE — 76775 US EXAM ABDO BACK WALL LIM: CPT

## 2019-07-09 PROCEDURE — 85027 COMPLETE CBC AUTOMATED: CPT

## 2019-07-09 PROCEDURE — 71045 X-RAY EXAM CHEST 1 VIEW: CPT

## 2019-07-09 PROCEDURE — 93976 VASCULAR STUDY: CPT

## 2019-07-09 PROCEDURE — 93005 ELECTROCARDIOGRAM TRACING: CPT | Mod: 76

## 2019-07-09 PROCEDURE — 96374 THER/PROPH/DIAG INJ IV PUSH: CPT | Mod: XU

## 2019-07-09 PROCEDURE — 83735 ASSAY OF MAGNESIUM: CPT

## 2019-07-09 PROCEDURE — 85730 THROMBOPLASTIN TIME PARTIAL: CPT

## 2019-07-09 PROCEDURE — 93306 TTE W/DOPPLER COMPLETE: CPT

## 2019-07-09 PROCEDURE — 82088 ASSAY OF ALDOSTERONE: CPT

## 2019-07-09 PROCEDURE — 74175 CTA ABDOMEN W/CONTRAST: CPT

## 2019-07-09 PROCEDURE — 99285 EMERGENCY DEPT VISIT HI MDM: CPT | Mod: 25

## 2019-07-09 PROCEDURE — 80048 BASIC METABOLIC PNL TOTAL CA: CPT

## 2019-07-09 PROCEDURE — 80061 LIPID PANEL: CPT

## 2019-07-09 PROCEDURE — C1894: CPT

## 2019-07-09 PROCEDURE — 84443 ASSAY THYROID STIM HORMONE: CPT

## 2019-07-09 PROCEDURE — C1887: CPT

## 2019-07-09 PROCEDURE — 84484 ASSAY OF TROPONIN QUANT: CPT

## 2019-07-09 PROCEDURE — 86038 ANTINUCLEAR ANTIBODIES: CPT

## 2019-07-09 PROCEDURE — C1753: CPT

## 2019-07-09 PROCEDURE — 84480 ASSAY TRIIODOTHYRONINE (T3): CPT

## 2019-07-09 PROCEDURE — C1769: CPT

## 2019-07-09 PROCEDURE — 36415 COLL VENOUS BLD VENIPUNCTURE: CPT

## 2019-07-09 PROCEDURE — C1725: CPT

## 2019-07-09 PROCEDURE — 82550 ASSAY OF CK (CPK): CPT

## 2019-07-09 PROCEDURE — 80053 COMPREHEN METABOLIC PANEL: CPT

## 2019-07-09 PROCEDURE — 85610 PROTHROMBIN TIME: CPT

## 2019-07-09 PROCEDURE — 81025 URINE PREGNANCY TEST: CPT

## 2019-07-09 PROCEDURE — C1874: CPT

## 2019-07-09 PROCEDURE — 71275 CT ANGIOGRAPHY CHEST: CPT

## 2019-07-09 PROCEDURE — 83036 HEMOGLOBIN GLYCOSYLATED A1C: CPT

## 2019-07-09 PROCEDURE — 85379 FIBRIN DEGRADATION QUANT: CPT

## 2019-07-09 PROCEDURE — 84436 ASSAY OF TOTAL THYROXINE: CPT

## 2020-01-03 ENCOUNTER — RX RENEWAL (OUTPATIENT)
Age: 40
End: 2020-01-03

## 2020-01-03 PROBLEM — D64.9 ANEMIA, UNSPECIFIED: Chronic | Status: ACTIVE | Noted: 2019-06-18

## 2020-01-03 RX ORDER — CARVEDILOL 12.5 MG/1
12.5 TABLET, FILM COATED ORAL TWICE DAILY
Qty: 180 | Refills: 3 | Status: ACTIVE | COMMUNITY
Start: 2019-07-01 | End: 1900-01-01

## 2020-01-03 RX ORDER — LISINOPRIL 2.5 MG/1
2.5 TABLET ORAL DAILY
Qty: 90 | Refills: 3 | Status: ACTIVE | COMMUNITY
Start: 2019-07-01 | End: 1900-01-01

## 2020-01-03 RX ORDER — TICAGRELOR 90 MG/1
90 TABLET ORAL TWICE DAILY
Qty: 180 | Refills: 3 | Status: ACTIVE | COMMUNITY
Start: 2019-07-01 | End: 1900-01-01

## 2020-01-03 RX ORDER — ATORVASTATIN CALCIUM 80 MG/1
80 TABLET, FILM COATED ORAL DAILY
Qty: 90 | Refills: 3 | Status: ACTIVE | COMMUNITY
Start: 2019-07-01 | End: 1900-01-01

## 2020-01-13 ENCOUNTER — APPOINTMENT (OUTPATIENT)
Dept: HEART AND VASCULAR | Facility: CLINIC | Age: 40
End: 2020-01-13

## 2020-01-18 NOTE — PROGRESS NOTE ADULT - PROBLEM SELECTOR PROBLEM 2
[facility]      MICU Progress Note  Arianna Siddiqi MRN: [id number]  1960  Date of Admission:1/15/2020  Primary care provider: No Ref-Primary, Physician      ASSESSMENT:  59 year old female with medical history significant for HFrEF, T2DM, CKD, HTN, gout, OHS/CARLOS, morbid obesity, admitted to the MICU with AMS and hypercapnic respiratory failure requiring continuous bipap     24 hr events: Mentation returned to baseline after bipap overnight. Tolerated lV diuresis w/ stable BP. Afebrile,      Changes   -acetazolamide  -bumex 0.5/hr  -HFNC during day  -SW consult     Neuro   #AMS, resolved  Confusion and lethargy in setting of PCO2 >90, likely CO2 narcosis. Mentation returned to baseline w/ correction of PCO2  - c/w HF and night time bipap, see below     CV  #Right sided CV dysfunction complicated by volume overload   With BNP > 5, 000, per patient, possibly as much as 60 lbs above baseline. ECHO showed no WMA, LVEF 55-60%, Difficult to assess RV. Function probably at least mildly reduced. Takes lasix pta, s/p 80mg IV lasix w/ 6L uop, transitioned to bumex 1/hr and metolazone but had increase in cr. bumex decreased to 0.5 w/ stablization of cr and continued good uop response  - acetazolamide 500 Q12H  - c/w bumex gtt 0.5/hr  - strict I/O, goal net neg 2L  - daily weights      # Paroxysmal atrial fibrillation   # nonsustained SVT, resolved  EKG on admission was in NSR but noted afib back in 2016. Has been anticoagulated with warfarin. INR 2.06 on admission. Rate control with metoprolol succinate, increased to 50mg bid after nonsustained SVT, pt HD stable and asxs. INR supratherapeutic at 4 today  - metoprolol succinate 50 bid  - holding warfarin  - INR daily     Pulm  # Acute hypoxic hypercarbic respiratory failure   # Respiratory acidosis  # Obesity hypoventilation  # CARLOS  Hypoxia 2/2 HFpEF exacerbation w/ high BNP, weight gain and pulm edema. Oxygenation improved w/ diuresis. Trop trended down, echo 
showed normal LVEF w/ no WMA, but unable to assess RV. CXR showed cardiomegaly. PCO2 improved with bipap. Flu and RSV neg  - c/w bipap at night and during naps  - vapotherm while awake  - c/w bumex gtt at 0.5/hr  - acetazolamide 500mg bid  - c/w azithro    GI  No active issues   -heart healthy diet while not on bipap      Renal  #JASON 2/2 hypervolemia vs septic component vs diuresis   #Olguria   -Yesterday with total of -6LUOP, today now with oliguria   --Scr 1.10--> Now 1.50 on recheck   - Furea pending   Diuresis   -s/p total of 6L of urine yesterday now with decline   -Volume/Diuretics: on bumex gtt @0.5 w/ x1 dose of metolazone w/ 50G of albumin   Plan    - f/u Furea   -Give 50G of albumin to see improvement with UOP   -Cont Bumex gtt at 0.5 at this time   -Maintain UOP >30cc/hr   -Monitor electrolytes closely w/ BID BMP     ID  #Concern for sepsis   hypertensive on arrival w/ neg procal. BP stable since transfer. lactat neg, slight leukocytosis. CXR neg for focal infiltrate, increased interstitial marking likely 2/2 fluid overload, but w/ URI prodrome days pta, will treat empirically for atypical pna. Flu and RSV neg. Received vancx1 and cefrtriaxone x1  - c/w azithromycin  - follow up bcx and urine cx       Endo  # T2DM  No recent hemoglobin A1C. Home regimen: sitagliptin 50 mg po qday.  - hold sitagliptin; pt might benefit from metformin as well but would defer to PCP  - cont medium sliding scale insulin  - cont fingerstick glucose qid  - cont hypoglycemia protocol  - BG q4h started clear liquid diet      MSK/Skin  # Gout - stable  - continue allopurinol 200 mg po qday        FEN: heart healthy diet  DVT Prophylaxis:  pta warfarin  Disposition: ICU pending diuresis and stablization of resp status  Code Status: Full    Patient was seen and discussed with attending physician Dr. Davalos, who agrees with above assessment and plan.    Joce Chang MD  Internal Medicine PGY-2  [phone]      Interval History: 
    NAEON, remained on bipap, HD stable, mentation returned to baseline. Afebrile.     4 Point Review of systems including CV, Respiratory, GI and  were negative unless otherwise noted.    PHYSICAL EXAM  Temp  Av.4  F (36.3  C)  Min: 94.4  F (34.7  C)  Max: 98.3  F (36.8  C)      Pulse  Av.8  Min: 60  Max: 99 Resp  Av.9  Min: 12  Max: 34  FiO2 (%)  Av.3 %  Min: 23 %  Max: 100 %  SpO2  Av.6 %  Min: 59 %  Max: 100 %         Intake/Output Summary (Last 24 hours) at 2020 1324  Last data filed at 2020 1200  Gross per 24 hour   Intake 1257.13 ml   Output 3788 ml   Net -2530.87 ml       Gen: obese, sitting in bed, NAD  HEENT: bipap in place, conjunctivae clear/pink  CV: RRR, no mrg, distant heart sound  Pulm: symmetrical chest rise, normal wob on bipap, harsh inspiratory breath sounds b/l, no crackles, diminished breath sounds in b/l bases, no wheezing  Abd: +BS, nontender, soft  Ext: trace LE edema b/l, chronic venous stasis skin changes  Neuro: AOx4, no focal signs      
HTN (hypertension)

## 2020-08-19 NOTE — ED ADULT NURSE NOTE - MODE OF DISCHARGE
Ambulatory Secondary Intention Text (Leave Blank If You Do Not Want): The defect will heal with secondary intention.

## 2020-09-09 NOTE — PATIENT PROFILE ADULT - DO YOU FEEL UNSAFE AT HOME, WORK, OR SCHOOL?
Case Management Discharge Note      Final Note:                     Final Discharge Disposition Code: 20 -    no

## 2022-03-26 NOTE — ED ADULT TRIAGE NOTE - SOURCE OF INFORMATION
Occupational Therapy   Occupational Therapy Initial Assessment  Date: 3/26/2022   Patient Name: Araceli Felix  MRN: [id number]     : 1965    Date of Service: 3/26/2022  Obtained from medical chart:   \" 79-year-old female with PMH of Crohn's disease, bowel obstruction, bilateral hip joint osteoarthritis and hypertension was admitted to observation unit after mechanical fall. She developed hypotension and transferred to internal medicine services. UA shows too many WBCs with many bacteria. Blood cultures and urine culture. Hypotension is resolved with fluid resuscitation. We will treat the UTI Rocephin and follow \"    Discharge Recommendations:  Patient would benefit from continued therapy after discharge  OT Equipment Recommendations  Equipment Needed: Yes  Mobility Devices: ADL Assistive Devices  ADL Assistive Devices: Shower Chair with back    Assessment   Performance deficits / Impairments: Decreased functional mobility ; Decreased ADL status; Decreased endurance;Decreased balance;Decreased safe awareness;Decreased high-level IADLs  Assessment: Patient demonstrates decreased safety awareness impacting performance and safety with ADLs and IADLs. Pt demonstrates safety concerns with functional tasks that increase risk for falls d/t improper use of rollator and decreased awareness to potential fall hazards. Pt receptive to fall prevention education this date and would benefit from continuation of therapy services to promote safety for discharge to safely return to prior living environment.   Prognosis: Good  Decision Making: Medium Complexity  Patient Education: OT role, OT POC, purpose of evaluation, hand placement for transfers, use of rollator, rollator positioning, safety awareness, fall prevention - fair return  REQUIRES OT FOLLOW UP: Yes  Activity Tolerance  Activity Tolerance: Patient Tolerated treatment well  Safety Devices  Safety Devices in place: Yes  Type of devices: Call light within reach;Nurse notified;Gait belt;Left in chair         Patient Diagnosis(es): The encounter diagnosis was Fall from standing, subsequent encounter. has a past medical history of Acid reflux, Anxiety, Arthritis, Asthma, Bipolar 1 disorder ([address] Bowel obstruction ([address] COPD (chronic obstructive pulmonary disease) ([address] Crohn disease ([address] Depression, Dry eye, Fibromyalgia, Gout, Headache, Hyperlipidemia, Hypertension, Hypothyroidism, Kidney stones, Muscle spasm, Neuropathy, Pain, Personal history of other medical treatment, Wears partial dentures, and Wellness examination. has a past surgical history that includes Tonsillectomy and adenoidectomy; Tubal ligation; Cholecystectomy; Bunionectomy (Left); Colonoscopy (04/30/2007); Colonoscopy (10/12/2017); Abdomen surgery; Upper gastrointestinal endoscopy (10/25/2021); Colonoscopy (10/25/2021); and Colonoscopy (10/25/2021). Restrictions  Restrictions/Precautions  Restrictions/Precautions: General Precautions  Required Braces or Orthoses?: No  Position Activity Restriction  Other position/activity restrictions: up with assist    Subjective   General  Patient assessed for rehabilitation services?: Yes  Family / Caregiver Present: No  General Comment  Comments: RN ok'd patient for OT/PT evaluation. Pt agreeable, pleasant and cooperative throughout.   Patient Currently in Pain: Denies  Vital Signs  Patient Currently in Pain: Denies     Social/Functional History  Social/Functional History  Lives With: Alone  Type of Home: Apartment (3rd floor)  Home Layout: One level  Home Access: Elevator,Level entry  Bathroom Shower/Tub: Tub/Shower unit,Curtain  Bathroom Toilet: Standard  Bathroom Equipment:  (have fallen in shower within past 6 months 1x. only in place last 3 weeks)  Bathroom Accessibility: Accessible  Home Equipment: Cane (rollator)  Receives Help From: Family,Friend(s) (can assist prn)  ADL Assistance: Independent  Homemaking Assistance: Independent  Homemaking Responsibilities: Yes (pt goes with friend/ dtr for grocery shopping. pt cooks. does own laundry at [employer], pushing it on rollator)  Meal Prep Responsibility: Primary  Laundry Responsibility: Primary  Cleaning Responsibility: Primary  Shopping Responsibility: Primary  Ambulation Assistance: Independent (rollator used at all times)  Transfer Assistance: Independent  Active : No  Mode of Transportation: Friends  Occupation: On disability  Leisure & Hobbies: shoot pool, place has pool table. Additional Comments: fall ~5x prior to one before admission in a week. Pt states uncertain cause, \"just go down\". Objective   Vision: Impaired  Vision Exceptions: Wears glasses for reading (mostly for reading)  Hearing: Within functional limits    Orientation  Overall Orientation Status: Within Functional Limits     Balance  Sitting Balance: Independent (on toilet for ~8-9 min, unsupported in recliner for ~6-7 min)  Standing Balance: Supervision  Standing Balance  Time: ~6-7 min  Activity: sinkside ADLs, toileting tasks, at recliner  Functional Mobility  Functional - Mobility Device:  (Rollator)  Activity: To/from bathroom  Assist Level: Stand by assistance  Functional Mobility Comments: VCs for placement of rollator during mobility to refrain from obstacles that increase fall risk  ADL  Feeding: Independent  Grooming: Independent  UE Bathing: Supervision;Verbal cueing  LE Bathing: Stand by assistance;Verbal cueing  UE Dressing: Supervision;Verbal cueing  LE Dressing: Stand by assistance;Verbal cueing  Toileting: Stand by assistance  Additional Comments: Patient standing upon arrival, completed functional mobility to bathroom to engage in toileting tasks. Pt required VCs for proper placement of rollator and locking brakes. Pt completed transfer at supervision with use of GBs for support. Pt required VCs for undergarment mgmt for problem solving to properly place pad.  Pt completed personal hygiene in a squatted position at SBA d/t decreased safety awareness and environmental awareness. Pt stood sinkside and completed hand hygiene independently, required SBA for balance d/t poor safety awareness. Tone RUE  RUE Tone: Normotonic  Tone LUE  LUE Tone: Normotonic  Coordination  Movements Are Fluid And Coordinated: Yes     Bed mobility  Comment: standing upon arrival, retired to bedside recliner at end of session  Transfers  Sit to stand: Stand by assistance  Stand to sit: Stand by assistance  Transfer Comments: VC for transfer for hand placement, locking brakes and appropriate placement of rollator     Cognition  Overall Cognitive Status: Exceptions  Arousal/Alertness: Appropriate responses to stimuli  Following Commands:  Follows one step commands consistently  Attention Span: Appears intact  Safety Judgement: Decreased awareness of need for safety;Decreased awareness of need for assistance  Problem Solving: Decreased awareness of errors  Insights: Decreased awareness of deficits  Initiation: Requires cues for some  Sequencing: Requires cues for some        Sensation  Overall Sensation Status: Impaired (Pt reports numbness/ tingling in bilateral hands and feet.)      LUE AROM : WFL  Left Hand AROM: WFL  RUE AROM : WFL  Right Hand AROM: WFL  LUE Strength  Gross LUE Strength: WFL  L Hand General: 4/5  LUE Strength Comment: grossly 4/5  RUE Strength  Gross RUE Strength: WFL  R Hand General: 4/5  RUE Strength Comment: grossly 4/5    Plan   Plan  Times per week: 3x/wk  Current Treatment Recommendations: Patient/Caregiver Education & Training,Home Management Training,Equipment Evaluation, Education, & procurement,Balance Training,Functional Mobility Training,Endurance Training,Safety Education & Training,Self-Care / ADL,Positioning  AM-PAC Score        AM-PAC Inpatient Daily Activity Raw Score: 20 (03/26/22 1355)  AM-PAC Inpatient ADL T-Scale Score : 42.03 (03/26/22 1355)  ADL Inpatient CMS 0-100% Score: 38.32 (03/26/22 1355)  ADL Inpatient CMS G-Code Modifier : Leland Reid (03/26/22 3303)    Goals  Short term goals  Time Frame for Short term goals: Patient will, by discharge  Short term goal 1: demo ADLs independently  Short term goal 2: demo functional transfers at Mod I with 0 VCs for hand placement  Short term goal 3: demo functional mobility independently, safely avoiding obstacles to reduce fall risk  Short term goal 4: demo 10+ min of dynamic standing tolerance at Supervision to engage in ADLs  Short term goal 5: demo good safety awareness throughout with 0 VCs  Short term goal 6: verbalize 2+ fall prevention strategies to implement into home environment to reduce fall risk     Therapy Time   Individual Concurrent Group Co-treatment   Time In 1016         Time Out 1046         Minutes 30         Timed Code Treatment Minutes: [address], OTR/L Patient

## 2022-08-11 ENCOUNTER — EMERGENCY (EMERGENCY)
Facility: HOSPITAL | Age: 42
LOS: 1 days | Discharge: HOME | End: 2022-08-11
Attending: EMERGENCY MEDICINE | Admitting: EMERGENCY MEDICINE

## 2022-08-11 VITALS
HEIGHT: 67 IN | RESPIRATION RATE: 16 BRPM | OXYGEN SATURATION: 98 % | SYSTOLIC BLOOD PRESSURE: 144 MMHG | HEART RATE: 63 BPM | TEMPERATURE: 98 F | DIASTOLIC BLOOD PRESSURE: 81 MMHG | WEIGHT: 192.9 LBS

## 2022-08-11 DIAGNOSIS — R42 DIZZINESS AND GIDDINESS: ICD-10-CM

## 2022-08-11 DIAGNOSIS — G43.909 MIGRAINE, UNSPECIFIED, NOT INTRACTABLE, WITHOUT STATUS MIGRAINOSUS: ICD-10-CM

## 2022-08-11 DIAGNOSIS — R55 SYNCOPE AND COLLAPSE: ICD-10-CM

## 2022-08-11 DIAGNOSIS — R10.30 LOWER ABDOMINAL PAIN, UNSPECIFIED: ICD-10-CM

## 2022-08-11 DIAGNOSIS — R11.2 NAUSEA WITH VOMITING, UNSPECIFIED: ICD-10-CM

## 2022-08-11 DIAGNOSIS — D25.9 LEIOMYOMA OF UTERUS, UNSPECIFIED: ICD-10-CM

## 2022-08-11 DIAGNOSIS — Z98.891 HISTORY OF UTERINE SCAR FROM PREVIOUS SURGERY: ICD-10-CM

## 2022-08-11 DIAGNOSIS — Z95.5 PRESENCE OF CORONARY ANGIOPLASTY IMPLANT AND GRAFT: ICD-10-CM

## 2022-08-11 DIAGNOSIS — H91.90 UNSPECIFIED HEARING LOSS, UNSPECIFIED EAR: ICD-10-CM

## 2022-08-11 DIAGNOSIS — Z20.822 CONTACT WITH AND (SUSPECTED) EXPOSURE TO COVID-19: ICD-10-CM

## 2022-08-11 DIAGNOSIS — I50.9 HEART FAILURE, UNSPECIFIED: ICD-10-CM

## 2022-08-11 DIAGNOSIS — I11.0 HYPERTENSIVE HEART DISEASE WITH HEART FAILURE: ICD-10-CM

## 2022-08-11 DIAGNOSIS — Z79.82 LONG TERM (CURRENT) USE OF ASPIRIN: ICD-10-CM

## 2022-08-11 LAB
ALBUMIN SERPL ELPH-MCNC: 4 G/DL — SIGNIFICANT CHANGE UP (ref 3.4–5)
ALP SERPL-CCNC: 61 U/L — SIGNIFICANT CHANGE UP (ref 40–120)
ALT FLD-CCNC: 6 U/L — LOW (ref 12–42)
ANION GAP SERPL CALC-SCNC: 8 MMOL/L — LOW (ref 9–16)
APPEARANCE UR: CLEAR — SIGNIFICANT CHANGE UP
APTT BLD: 25.7 SEC — LOW (ref 27.5–35.5)
AST SERPL-CCNC: 21 U/L — SIGNIFICANT CHANGE UP (ref 15–37)
BASOPHILS # BLD AUTO: 0.03 K/UL — SIGNIFICANT CHANGE UP (ref 0–0.2)
BASOPHILS NFR BLD AUTO: 0.6 % — SIGNIFICANT CHANGE UP (ref 0–2)
BILIRUB SERPL-MCNC: 0.4 MG/DL — SIGNIFICANT CHANGE UP (ref 0.2–1.2)
BILIRUB UR-MCNC: NEGATIVE — SIGNIFICANT CHANGE UP
BUN SERPL-MCNC: 14 MG/DL — SIGNIFICANT CHANGE UP (ref 7–23)
CALCIUM SERPL-MCNC: 8.9 MG/DL — SIGNIFICANT CHANGE UP (ref 8.5–10.5)
CHLORIDE SERPL-SCNC: 105 MMOL/L — SIGNIFICANT CHANGE UP (ref 96–108)
CO2 SERPL-SCNC: 27 MMOL/L — SIGNIFICANT CHANGE UP (ref 22–31)
COLOR SPEC: YELLOW — SIGNIFICANT CHANGE UP
CREAT SERPL-MCNC: 0.71 MG/DL — SIGNIFICANT CHANGE UP (ref 0.5–1.3)
DIFF PNL FLD: NEGATIVE — SIGNIFICANT CHANGE UP
EGFR: 109 ML/MIN/1.73M2 — SIGNIFICANT CHANGE UP
EOSINOPHIL # BLD AUTO: 0.1 K/UL — SIGNIFICANT CHANGE UP (ref 0–0.5)
EOSINOPHIL NFR BLD AUTO: 2 % — SIGNIFICANT CHANGE UP (ref 0–6)
GLUCOSE SERPL-MCNC: 99 MG/DL — SIGNIFICANT CHANGE UP (ref 70–99)
GLUCOSE UR QL: NEGATIVE — SIGNIFICANT CHANGE UP
HCG UR QL: NEGATIVE — SIGNIFICANT CHANGE UP
HCT VFR BLD CALC: 38.5 % — SIGNIFICANT CHANGE UP (ref 34.5–45)
HGB BLD-MCNC: 12.1 G/DL — SIGNIFICANT CHANGE UP (ref 11.5–15.5)
IMM GRANULOCYTES NFR BLD AUTO: 0.6 % — SIGNIFICANT CHANGE UP (ref 0–1.5)
INR BLD: 1.03 — SIGNIFICANT CHANGE UP (ref 0.88–1.16)
KETONES UR-MCNC: 15 MG/DL
LEUKOCYTE ESTERASE UR-ACNC: NEGATIVE — SIGNIFICANT CHANGE UP
LYMPHOCYTES # BLD AUTO: 1.15 K/UL — SIGNIFICANT CHANGE UP (ref 1–3.3)
LYMPHOCYTES # BLD AUTO: 22.7 % — SIGNIFICANT CHANGE UP (ref 13–44)
MAGNESIUM SERPL-MCNC: 2 MG/DL — SIGNIFICANT CHANGE UP (ref 1.6–2.6)
MCHC RBC-ENTMCNC: 27.8 PG — SIGNIFICANT CHANGE UP (ref 27–34)
MCHC RBC-ENTMCNC: 31.4 GM/DL — LOW (ref 32–36)
MCV RBC AUTO: 88.3 FL — SIGNIFICANT CHANGE UP (ref 80–100)
MONOCYTES # BLD AUTO: 0.25 K/UL — SIGNIFICANT CHANGE UP (ref 0–0.9)
MONOCYTES NFR BLD AUTO: 4.9 % — SIGNIFICANT CHANGE UP (ref 2–14)
NEUTROPHILS # BLD AUTO: 3.5 K/UL — SIGNIFICANT CHANGE UP (ref 1.8–7.4)
NEUTROPHILS NFR BLD AUTO: 69.2 % — SIGNIFICANT CHANGE UP (ref 43–77)
NITRITE UR-MCNC: NEGATIVE — SIGNIFICANT CHANGE UP
NRBC # BLD: 0 /100 WBCS — SIGNIFICANT CHANGE UP (ref 0–0)
NT-PROBNP SERPL-SCNC: 35 PG/ML — SIGNIFICANT CHANGE UP
PH UR: 7.5 — SIGNIFICANT CHANGE UP (ref 5–8)
PLATELET # BLD AUTO: 216 K/UL — SIGNIFICANT CHANGE UP (ref 150–400)
POTASSIUM SERPL-MCNC: 3.8 MMOL/L — SIGNIFICANT CHANGE UP (ref 3.5–5.3)
POTASSIUM SERPL-SCNC: 3.8 MMOL/L — SIGNIFICANT CHANGE UP (ref 3.5–5.3)
PROT SERPL-MCNC: 7.5 G/DL — SIGNIFICANT CHANGE UP (ref 6.4–8.2)
PROT UR-MCNC: NEGATIVE MG/DL — SIGNIFICANT CHANGE UP
PROTHROM AB SERPL-ACNC: 12 SEC — SIGNIFICANT CHANGE UP (ref 10.5–13.4)
RBC # BLD: 4.36 M/UL — SIGNIFICANT CHANGE UP (ref 3.8–5.2)
RBC # FLD: 14.7 % — HIGH (ref 10.3–14.5)
SARS-COV-2 RNA SPEC QL NAA+PROBE: SIGNIFICANT CHANGE UP
SODIUM SERPL-SCNC: 140 MMOL/L — SIGNIFICANT CHANGE UP (ref 132–145)
SP GR SPEC: 1.01 — SIGNIFICANT CHANGE UP (ref 1–1.03)
TROPONIN I, HIGH SENSITIVITY RESULT: <4 NG/L — SIGNIFICANT CHANGE UP
UROBILINOGEN FLD QL: 0.2 E.U./DL — SIGNIFICANT CHANGE UP
WBC # BLD: 5.06 K/UL — SIGNIFICANT CHANGE UP (ref 3.8–10.5)
WBC # FLD AUTO: 5.06 K/UL — SIGNIFICANT CHANGE UP (ref 3.8–10.5)

## 2022-08-11 PROCEDURE — 70450 CT HEAD/BRAIN W/O DYE: CPT | Mod: 26

## 2022-08-11 PROCEDURE — 99285 EMERGENCY DEPT VISIT HI MDM: CPT | Mod: 25

## 2022-08-11 PROCEDURE — 93010 ELECTROCARDIOGRAM REPORT: CPT

## 2022-08-11 RX ORDER — ACETAMINOPHEN 500 MG
650 TABLET ORAL ONCE
Refills: 0 | Status: COMPLETED | OUTPATIENT
Start: 2022-08-11 | End: 2022-08-11

## 2022-08-11 RX ORDER — SODIUM CHLORIDE 9 MG/ML
1000 INJECTION INTRAMUSCULAR; INTRAVENOUS; SUBCUTANEOUS ONCE
Refills: 0 | Status: COMPLETED | OUTPATIENT
Start: 2022-08-11 | End: 2022-08-11

## 2022-08-11 RX ORDER — MECLIZINE HCL 12.5 MG
25 TABLET ORAL ONCE
Refills: 0 | Status: COMPLETED | OUTPATIENT
Start: 2022-08-11 | End: 2022-08-11

## 2022-08-11 RX ORDER — METOCLOPRAMIDE HCL 10 MG
10 TABLET ORAL ONCE
Refills: 0 | Status: COMPLETED | OUTPATIENT
Start: 2022-08-11 | End: 2022-08-11

## 2022-08-11 RX ADMIN — Medication 650 MILLIGRAM(S): at 22:41

## 2022-08-11 RX ADMIN — Medication 10 MILLIGRAM(S): at 22:41

## 2022-08-11 RX ADMIN — Medication 25 MILLIGRAM(S): at 22:42

## 2022-08-11 RX ADMIN — SODIUM CHLORIDE 1000 MILLILITER(S): 9 INJECTION INTRAMUSCULAR; INTRAVENOUS; SUBCUTANEOUS at 22:42

## 2022-08-11 NOTE — ED ADULT TRIAGE NOTE - CHIEF COMPLAINT QUOTE
Pt. walk in c/o multiple episodes of dizziness like the room is spinning lasting a few minutes each time since monday. Today pt. has been having dizzy spells more frequently and also c/o lower abd pain and vom x 1.

## 2022-08-11 NOTE — ED ADULT NURSE NOTE - OBJECTIVE STATEMENT
Patient presents with multiple episodes of dizziness lasting a couple sec to min since Monday. Patient described it as the room spinning. Symtoms started monday and has been ongoing everyday since then. Tuesday patient was kickboxing when dizziness happened. Today, pt was working out at 7 pm and started to feel dizzy and nauseated. Vomited once. C/o abdominal discomfort. Denies SOB, CP, palpitation, fever, chills, extremity weakness.

## 2022-08-11 NOTE — ED PROVIDER NOTE - PATIENT PORTAL LINK FT
You can access the FollowMyHealth Patient Portal offered by [facility] by registering at the following website: [email or web address]. By joining [employer]’s FollowMyHealth portal, you will also be able to view your health information using other applications (apps) compatible with our system.

## 2022-08-11 NOTE — ED PROVIDER NOTE - CARE PROVIDER_API CALL
Johnny Kelley)  Neurology; Vascular Neurology  [address], [address] [place] [address]  Phone: [phone]  Fax: [phone]  Follow Up Time:

## 2022-08-11 NOTE — ED PROVIDER NOTE - NSFOLLOWUPINSTRUCTIONS_ED_ALL_ED_FT
Benign Paroxysmal Positional Vertigo    WHAT YOU NEED TO KNOW:    What is benign paroxysmal positional vertigo (BPPV)? BPPV is an inner ear condition that causes you to suddenly feel dizzy. Benign means it is not serious or life-threatening. BPPV is caused by a problem with the nerves and structure of your inner ear. BPPV happens when small pieces of calcium break loose and lump together in one of your inner ear canals.   Ear Anatomy         What are the signs and symptoms of BPPV? You may feel that you or the room is moving or spinning. Turning your head, rolling over in bed, getting up or lying down may lead to sudden vertigo. You may also have any of the following symptoms:  •Nystagmus (quick shaky eye movement that you cannot control)      •Nausea      •Poor balance and feeling unsteady when you walk      What increases my risk for BPPV?   •Older age      •An injury or trauma to your head or neck      •Frequent ear infections      •Long-term bed rest      •A medical condition such as diabetes, high blood pressure, migraine headaches, or Ménière disease      How is BPPV diagnosed?  Your healthcare provider will ask about your symptoms and examine you. Your healthcare provider can usually determine if you have BPPV by doing a few simple tests. He or she may have you move your head or body in certain ways. Tell him or her if you feel dizzy or nauseated during these movements.    How is BPPV managed?   •Your healthcare provider will teach you how to move your head and body to prevent symptoms. For example, he or she may teach you certain ways to move your head or body. These movements usually help relieve your symptoms and keep the dizziness from returning. The exercises help move the calcium pieces to a different part of your ear. Do the movements only as directed.       •Vestibular and balance rehabilitation therapy (VBRT) is used to teach you exercises to improve your balance and strength. VBRT may help decrease your dizziness and prevent injuries if you are at risk for falls.       •Medicines may be recommended or prescribed to treat dizziness or nausea.      How can I help prevent my symptoms?   •Try to avoid sudden head movements. Stand up and lie down slowly.       •Raise and support your head when you lie down. Place pillows under your upper back and head or rest in a recliner.       •Change your position often when you are lying down. Try not to lie with your head on the same side for long periods of time. Roll over slowly.       •Wear protective gear when you ride a bike or play sports. A helmet helps protect your head from injury.      When should I seek immediate care?   •You fall during a BPPV episode and are injured.      •You have a severe headache that does not go away.      •You have new changes in your vision or feel weak or confused.      •You have problems hearing, or you have ringing or buzzing in your ears.      When should I contact my healthcare provider?   •Your BPPV symptoms do not go away or they return.      •You have problems with your balance, or you are falling often.      •You have new or increased nausea or vomiting with vertigo.      •You feel anxious or depressed and do not want to leave your home.      •You have questions or concerns about your condition or care.      CARE AGREEMENT:    You have the right to help plan your care. Learn about your health condition and how it may be treated. Discuss treatment options with your healthcare providers to decide what care you want to receive. You always have the right to refuse treatment

## 2022-08-11 NOTE — ED PROVIDER NOTE - NEUROLOGICAL, MLM
Alert and oriented, no focal deficits, no motor or sensory deficits. gait is normal Alert and oriented, no focal deficits, no motor deficits. gait is normal

## 2022-08-11 NOTE — ED PROVIDER NOTE - CLINICAL SUMMARY MEDICAL DECISION MAKING FREE TEXT BOX
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 Pt states that she has had vertigo sxs a few years ago which were associated with "hearing loss" and syncope. 4 days ago pt developed "room spinning" sensation that lasted a few seconds and then resolved. This continued for the next couple of days and worsened with activities that required "quick head movements" (pt was taking a boxing class). Today sxs worsened and pt had associated nausea and 1 episode of nbnb emesis with some lower abd pain. Called her NP's offfice and was told to come to ED for further eval. Has a HA c/w migraine at onset of sxs, but no HA currently. Denies focal weakness, numbness, ataxia, fevers, chills, neck stiffness, CP, SOB, pregnancy. No other complaints.  ED course: VS noted and WNL. Pt given IVF, Meclizine and Reglan. ECG with NAD. Labs and CT head ordered. Case endorsed to night team pending labs and CT head and reevaluation. To be dcd with outpt f/up if studies are normal. 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 Pt states that she has had vertigo sxs a few years ago which were associated with "hearing loss" and syncope. 4 days ago pt developed "room spinning" sensation that lasted a few seconds and then resolved. This continued for the next couple of days and worsened with activities that required "quick head movements" (pt was taking a boxing class). Today sxs worsened and pt had associated nausea and 1 episode of nbnb emesis with some lower abd pain. Called her NP's offfice and was told to come to ED for further eval. Has a HA c/w migraine at onset of sxs, but no HA currently. Denies focal weakness, numbness, ataxia, fevers, chills, neck stiffness, CP, SOB, pregnancy. No other complaints.  ED course: VS noted and WNL. Pt given IVF, acetaminophen, Meclizine and Reglan. ECG with NAD. Labs and CT head ordered. CBC WNL. UA with no infection, but ketones noted. Case endorsed to night team pending labs, CT head and reevaluation. To be dcd with outpt f/up if studies are normal.

## 2022-08-11 NOTE — ED PROVIDER NOTE - PROGRESS NOTE DETAILS
Patient endorsed to me pending CT brain which is negative.  Discussion made with patient regarding lab results and CT which are normal she received fluids.  Patient is feeling much better.  Is a patient of Dr. Maxine Wisdom and has a private outpatient follow-up.  Notes she did have a televisit with her doctor today and subsequently came to the ED for treatment.  Denies worse headache of her life denies worsening dizziness nausea vomiting in the ED at this time.  Patient also complains of upset stomach.  On reexamination of her abdomen there is no focal tenderness she feels as if she has gas.  Advised Maalox or Pepcid over-the-counter but otherwise continue with Reglan and meclizine as needed and follow-up with primary, if any symptoms were to worsen or be continue as she should return to the ED immediately.  She understands plan and has no questions

## 2022-08-11 NOTE — ED PROVIDER NOTE - OBJECTIVE STATEMENT
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 Pt states that she has had vertigo sxs a few years ago which were associated with "hearing loss" and syncope. 4 days ago pt developed "room spinning" sensation that lasted a few seconds and then resolved. This continued for the next couple of days and worsened with activities that required "quick head movements" (pt was taking a boxing class). Today sxs worsened and pt had associated nausea and 1 episode of nbnb emesis with some lower abd pain. Called her NP's offfice and was told to come to ED for further eval. Has a HA c/w migraine at onset of sxs, but no HA currently. Denies focal weakness, numbness, ataxia, fevers, chills, neck stiffness, CP, SOB, pregnancy. No other complaints. 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 Pt states that she has had vertigo sxs a few years ago which were associated with "hearing loss" and syncope. 4 days ago pt developed "room spinning" sensation that lasted a few seconds and then resolved. This continued for the next couple of days and worsened with activities that required "quick head movements" (pt was taking a boxing class). Today sxs worsened and pt had associated nausea and 1 episode of nbnb emesis with some lower abd pain. Did a telemedicine call with her HCP  and was told to come to ED for further eval. Has a HA c/w migraine at onset of sxs, but no HA currently. Denies focal weakness, numbness, ataxia, fevers, chills, neck stiffness, CP, SOB, pregnancy. No other complaints.

## 2022-08-12 VITALS
HEART RATE: 59 BPM | RESPIRATION RATE: 17 BRPM | OXYGEN SATURATION: 98 % | SYSTOLIC BLOOD PRESSURE: 137 MMHG | DIASTOLIC BLOOD PRESSURE: 62 MMHG

## 2022-08-12 RX ORDER — MECLIZINE HCL 12.5 MG
1 TABLET ORAL
Qty: 21 | Refills: 0
Start: 2022-08-12 | End: 2022-08-18

## 2022-08-12 RX ORDER — METOCLOPRAMIDE HCL 10 MG
1 TABLET ORAL
Qty: 21 | Refills: 0
Start: 2022-08-12 | End: 2022-08-18

## 2022-08-12 RX ADMIN — Medication 650 MILLIGRAM(S): at 00:14

## 2022-08-12 NOTE — ED ADULT NURSE REASSESSMENT NOTE - NS ED NURSE REASSESS COMMENT FT1
Transfer of care acknowledged with bedside rounding, lab results reviewed, will continue to monitor.  pt in nad, resting comfortably in stretcher  reports still has abd pain  MD made aware

## 2024-01-31 NOTE — PROGRESS NOTE ADULT - PROBLEM SELECTOR PROBLEM 1
Venipuncture performed on Left Arm by Karol Maxwell MA  with good hemostasis. Patient tolerated well. 01/31/24  Ban Alexander MD      
NSTEMI (non-ST elevated myocardial infarction)